# Patient Record
Sex: MALE | Race: WHITE | NOT HISPANIC OR LATINO | Employment: OTHER | URBAN - NONMETROPOLITAN AREA
[De-identification: names, ages, dates, MRNs, and addresses within clinical notes are randomized per-mention and may not be internally consistent; named-entity substitution may affect disease eponyms.]

---

## 2017-02-03 ENCOUNTER — TELEPHONE (OUTPATIENT)
Dept: ORTHOPEDICS | Facility: OTHER | Age: 72
End: 2017-02-03

## 2017-02-03 DIAGNOSIS — G89.29 CHRONIC SHOULDER PAIN, UNSPECIFIED LATERALITY: Primary | ICD-10-CM

## 2017-02-03 DIAGNOSIS — M25.519 CHRONIC SHOULDER PAIN, UNSPECIFIED LATERALITY: Primary | ICD-10-CM

## 2017-02-03 RX ORDER — CYCLOBENZAPRINE HCL 5 MG
TABLET ORAL
Qty: 30 TABLET | Refills: 0 | Status: SHIPPED | OUTPATIENT
Start: 2017-02-03 | End: 2017-03-22

## 2017-02-03 NOTE — TELEPHONE ENCOUNTER
Reason for call:  Medication    1. Medication Name? cyclobenzaprine (FLEXERIL) 5 MG tablet  2. Is this request for a refill? Yes  3. What Pharmacy do you use? [facility] [address] [place] [address]  4. Have you contacted your pharmacy? No    5. If yes, when?  (Please note that the turn-around-time for prescriptions is 72 business hours; I am sending your request at this time. SEND TO  [facility]  )  Description: Pt is having shoulder pain and would like to know if he can get a refill of this Rx from Dr Morris or Nik Gunn. He is in [place] for another month.  Was an appointment offered for this a call? No   Preferred method for responding to this messageTelephone Call -[phone]  If we cannot reach you directly, may we leave a detailed response at the number you provided? Yes  Can this message wait until your PCP/Provider returns if not available today? No

## 2017-02-03 NOTE — TELEPHONE ENCOUNTER
Last visit: 12.7.17 with Dipesh Morris.  Patient is having shoulder pain and requesting refill.  Both Arturo and Luis A are out.  Please advise.  Thank you  Last refill: 6.9.16 #[phone]

## 2017-03-08 ENCOUNTER — TRANSFERRED RECORDS (OUTPATIENT)
Dept: HEALTH INFORMATION MANAGEMENT | Facility: HOSPITAL | Age: 72
End: 2017-03-08

## 2017-03-08 DIAGNOSIS — M54.10 RADICULAR PAIN OF RIGHT LOWER EXTREMITY: ICD-10-CM

## 2017-03-08 DIAGNOSIS — M54.50 LUMBAGO: Primary | ICD-10-CM

## 2017-03-09 ENCOUNTER — HOSPITAL ENCOUNTER (OUTPATIENT)
Dept: MRI IMAGING | Facility: HOSPITAL | Age: 72
Discharge: HOME | End: 2017-03-09
Attending: ORTHOPAEDIC SURGERY | Admitting: ORTHOPAEDIC SURGERY
Payer: MEDICARE

## 2017-03-09 ENCOUNTER — RESULTS ONLY (OUTPATIENT)
Dept: CARE COORDINATION | Facility: OTHER | Age: 72
End: 2017-03-09

## 2017-03-09 PROCEDURE — 72148 MRI LUMBAR SPINE W/O DYE: CPT | Mod: TC

## 2017-03-14 ENCOUNTER — TRANSFERRED RECORDS (OUTPATIENT)
Dept: HEALTH INFORMATION MANAGEMENT | Facility: HOSPITAL | Age: 72
End: 2017-03-14

## 2017-03-14 DIAGNOSIS — M54.16 LUMBAR RADICULOPATHY: ICD-10-CM

## 2017-03-14 DIAGNOSIS — M48.061 SPINAL STENOSIS, LUMBAR REGION, WITHOUT NEUROGENIC CLAUDICATION: ICD-10-CM

## 2017-03-14 DIAGNOSIS — M51.26 DISPLACEMENT OF LUMBAR INTERVERTEBRAL DISC WITHOUT MYELOPATHY: Primary | ICD-10-CM

## 2017-03-15 ENCOUNTER — HOSPITAL ENCOUNTER (OUTPATIENT)
Dept: GENERAL RADIOLOGY | Facility: HOSPITAL | Age: 72
Discharge: HOME | End: 2017-03-15
Attending: ORTHOPAEDIC SURGERY | Admitting: ORTHOPAEDIC SURGERY
Payer: MEDICARE

## 2017-03-15 PROCEDURE — 99212 OFFICE O/P EST SF 10 MIN: CPT | Mod: TC

## 2017-03-15 NOTE — PROGRESS NOTES
"Pain Management Referral Consult    PATIENT HISTORICAL:    Patient Anatomy/region of concern:  Lumbar spine    When and how did your pain begin?   Date  1 year more pain 1/2017    Event  [place] spring 2016    Location of the pain? Low back and radiates to right knee, back of thigh    Describe what pain feels like:  Hurts when walks; sharp  Does it interfere with daily activities?  yes    What makes your pain better?  Sitting, not moving    Focal tenderness at one point along the length of a taut band (\"knotted muscle\")?  No    Restricted range of motion of the involved muscle or joint?  No    Did you have a previous injection series where you found relief of at least 3 months?  No  Have you had surgery in the area of pain?   No  If yes, when was the surgery?    What treatments have you already had for your pain?   Add length of time in weeks for all that apply.    Treatment Tried it--helped Tried it-- nohelp Made it worse   Pain clinic      Physical therapy      Chiropractic care      Spine injections       Other nerve blocks      Surgery      Exercise Sometimes helps  Sometimes makes it worse     Others (list):          Injection Documentation of Provider History    PER-PROVIDER HISTORY, Dr. Azar:  Is this for treatment of acute herpes zoster, post herpetic neuralgia, post-decompressive radiculitis, or post-surgical scarring?   No  Does the patient have radiculopathy or sciatica?  Yes  RIGHT  How long has the patient had any physical therapy, home therapy or chiropractor care?  0 weeks.  How long has the patient taken NSaids or muscle relaxants?  As needed for 2months.  Is there any history of systemic/local infection or unstable medical conditions?  No    "

## 2017-03-22 ENCOUNTER — OFFICE VISIT (OUTPATIENT)
Dept: ORTHOPEDICS | Facility: OTHER | Age: 72
End: 2017-03-22
Attending: ORTHOPAEDIC SURGERY
Payer: COMMERCIAL

## 2017-03-22 VITALS
SYSTOLIC BLOOD PRESSURE: 120 MMHG | BODY MASS INDEX: 18.34 KG/M2 | DIASTOLIC BLOOD PRESSURE: 76 MMHG | TEMPERATURE: 98 F | OXYGEN SATURATION: 98 % | RESPIRATION RATE: 16 BRPM | HEART RATE: 84 BPM | HEIGHT: 68 IN | WEIGHT: 121 LBS

## 2017-03-22 DIAGNOSIS — M19.112 OSTEOARTHRITIS OF LEFT SHOULDER DUE TO ROTATOR CUFF INJURY: Primary | ICD-10-CM

## 2017-03-22 DIAGNOSIS — M48.00 SPINAL STENOSIS: Primary | ICD-10-CM

## 2017-03-22 DIAGNOSIS — S46.002S OSTEOARTHRITIS OF LEFT SHOULDER DUE TO ROTATOR CUFF INJURY: Primary | ICD-10-CM

## 2017-03-22 DIAGNOSIS — M54.16 LUMBAR RADICULOPATHY: ICD-10-CM

## 2017-03-22 PROCEDURE — 99212 OFFICE O/P EST SF 10 MIN: CPT

## 2017-03-22 PROCEDURE — 99213 OFFICE O/P EST LOW 20 MIN: CPT | Performed by: ORTHOPAEDIC SURGERY

## 2017-03-22 ASSESSMENT — PAIN SCALES - GENERAL: PAINLEVEL: NO PAIN (0)

## 2017-03-22 NOTE — NURSING NOTE
"Chief Complaint   Patient presents with     RECHECK     3 Month follow up ORIF left clavicle fracture 06/06/16.       Initial /76 (BP Location: Right arm, Patient Position: Chair, Cuff Size: Adult Regular)  Pulse 84  Temp 98  F (36.7  C) (Tympanic)  Resp 16  Ht 5' 8\" (1.727 m)  Wt 121 lb (54.9 kg)  SpO2 98%  BMI 18.4 kg/m2 Estimated body mass index is 18.4 kg/(m^2) as calculated from the following:    Height as of this encounter: 5' 8\" (1.727 m).    Weight as of this encounter: 121 lb (54.9 kg).  Medication Reconciliation: complete   Donna Pérez LPN      "

## 2017-03-22 NOTE — PROGRESS NOTES
"Chief complaint:  #1.  DJD left shoulder  #2.  Probable recurrent rotator cuff tear left shoulder  #3.  Well healed left clavicle fracture, ORIF 6/6/16    Subjective: This 71-year-old right-handed retired male has a history of DJD of the left shoulder which developed after an open [place] procedure.  He failed to regain full range of motion or strength after that procedure.  In June of this year he sustained a left midshaft clavicle fracture which underwent ORIF on 6/16/16.  At an office visit on 9/14/16 the fracture was noted to be healing in anatomic alignment on x-ray.  I offered to refer him to physical therapy but he declined.  I instructed him on a home exercise program.  On 12/7/16 he returned reporting no discomfort at the fracture site, but discomfort at the left sternoclavicular joint and glenohumeral joint.  Again I suggested physical therapy, and again he declined stating he wanted to wait until he returned from going to [place] for the winter.    Today he reports he is back from [place].  His shoulder in general feels \"stiff\".  While in [place] he herniated a lumbar disc.  He saw an orthopedist in [place] who referred him for a steroid injection which has not occurred yet.  Because of his back he is not willing to go through physical therapy for his shoulder.  He said his back is his biggest problem at this time and wants to focus on it.    Other than the above, his neurologic and musculoskeletal review of systems are unchanged since seen last.    Examination: Thin but otherwise healthy-appearing male with appropriate mood and affect.  Active range of motion of the left shoulder is 150  of elevation, full external rotation and internal rotation and adduction.  Motor examination is grade 4+ in forward flexion and grade 4 in abduction.  The neurovascular status of the left hand is intact.    Impression: Same as chief complaints    Plan: I was able to talk him into attending one physical therapy session to " learn a home exercise program to do on his own.  I pointed out that he might as well convalesce from 2 problems at the same time so that he can hopefully feel better faster.  He agreed to the therapist instructed home exercise program and to return to see me as needed. Future options include obtaining an MRI to see if his cuff is torn again, in which case he may be a candidate for a RTSA.

## 2017-03-22 NOTE — MR AVS SNAPSHOT
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 *     [facility] provides Physical Therapy evaluation and treatment and many specialty services across the [place] [facility] system.  If requesting a specialty program, please choose from the list below.    If you have not heard from the scheduling office within 2 business days, please call [phone] for all locations, with the exception of [place], please call [phone].  Treatment: Evaluation & Treatment  Special Instructions/Modalities: 1 teaching session for left shoulder home exercise program: ROM and strengthening  Special Programs:     Please be aware that coverage of these services is subject to the terms and limitations of your health insurance plan.  Call member services at your health plan with any benefit or coverage questions.      **Note to Provider:  If you are referring outside of [facility] for the therapy appointment, please list the name of the location in the  special instructions  above, print the referral and give to the patient to schedule the appointment.                  Follow-up notes from your care team     Return if symptoms worsen or fail to improve.      Who to contact     If you have questions or need follow up information about today's clinic visit or your schedule please contact  ORTHOPEDICS directly  "at [phone].  Normal or non-critical lab and imaging results will be communicated to you by MyChart, letter or phone within 4 business days after the clinic has received the results. If you do not hear from us within 7 days, please contact the clinic through [employer] [facility] or phone. If you have a critical or abnormal lab result, we will notify you by phone as soon as possible.  Submit refill requests through [employer] or call your pharmacy and they will forward the refill request to us. Please allow 3 business days for your refill to be completed.          Additional Information About Your Visit        [employer] [employer] Information     [employer] lets you send messages to your doctor, view your test results, renew your prescriptions, schedule appointments and more. To sign up, go to [email or web address] . Click on \"Log in\" on the left side of the screen, which will take you to the Welcome page. Then click on \"Sign up Now\" on the right side of the page.     You will be asked to enter the access code listed below, as well as some personal information. Please follow the directions to create your username and password.     Your access code is: [email or web address]  Expires: 2017 11:02 AM     Your access code will  in 90 days. If you need help or a new code, please call your [place] clinic or [phone].        Care EveryWhere ID     This is your Care EveryWhere ID. This could be used by other organizations to access your [place] medical records  [id number]        Your Vitals Were     Pulse Temperature Respirations Height Pulse Oximetry BMI (Body Mass Index)    84 98  F (36.7  C) (Tympanic) 16 5' 8\" (1.727 m) 98% 18.4 kg/m2       Blood Pressure from Last 3 Encounters:   17 120/76   16 104/80   16 118/68    Weight from Last 3 Encounters:   17 121 lb (54.9 kg)   16 116 lb (52.6 kg)   16 115 lb (52.2 kg)              We Performed the Following     PHYSICAL THERAPY REFERRAL        Primary Care " Provider Office Phone # Fax #    R Ryley Saleh [phone]       [facility] [place] [address] [place] [address]        Thank you!     Thank you for choosing  ORTHOPEDICS  for your care. Our goal is always to provide you with excellent care. Hearing back from our patients is one way we can continue to improve our services. Please take a few minutes to complete the written survey that you may receive in the mail after your visit with us. Thank you!             Your Updated Medication List - Protect others around you: Learn how to safely use, store and throw away your medicines at www.disposemymeds.org.      Notice  As of 3/22/2017 11:02 AM    You have not been prescribed any medications.

## 2017-03-24 ENCOUNTER — HOSPITAL ENCOUNTER (OUTPATIENT)
Dept: INTERVENTIONAL RADIOLOGY/VASCULAR | Facility: HOSPITAL | Age: 72
Discharge: HOME | End: 2017-03-24
Attending: ORTHOPAEDIC SURGERY | Admitting: ORTHOPAEDIC SURGERY
Payer: MEDICARE

## 2017-03-24 PROCEDURE — 64483 NJX AA&/STRD TFRM EPI L/S 1: CPT | Mod: TC

## 2017-03-24 PROCEDURE — 25000125 ZZHC RX 250: Performed by: RADIOLOGY

## 2017-03-24 RX ORDER — IOPAMIDOL 612 MG/ML
15 INJECTION, SOLUTION INTRATHECAL ONCE
Status: COMPLETED | OUTPATIENT
Start: 2017-03-24 | End: 2017-03-24

## 2017-03-24 RX ORDER — DEXAMETHASONE SODIUM PHOSPHATE 10 MG/ML
INJECTION, SOLUTION INTRAMUSCULAR; INTRAVENOUS
Status: DISPENSED
Start: 2017-03-24 | End: 2017-03-24

## 2017-03-24 RX ORDER — DEXAMETHASONE SODIUM PHOSPHATE 10 MG/ML
10 INJECTION, SOLUTION INTRAMUSCULAR; INTRAVENOUS ONCE
Status: COMPLETED | OUTPATIENT
Start: 2017-03-24 | End: 2017-03-24

## 2017-03-24 RX ADMIN — IOPAMIDOL 3 ML: 612 INJECTION, SOLUTION INTRATHECAL at 11:09

## 2017-03-24 RX ADMIN — DEXAMETHASONE SODIUM PHOSPHATE 10 MG: 10 INJECTION INTRAMUSCULAR; INTRAVENOUS at 11:10

## 2017-03-24 NOTE — IP AVS SNAPSHOT
MRN:[id number]                      After Visit Summary   3/24/2017    Augie Hernandez    MRN: [id number]           Visit Information        Provider Department      3/24/2017 10:30 AM Radiologist, Need Interventional; [facility]           Review of your medicines      Notice     You have not been prescribed any medications.             Protect others around you: Learn how to safely use, store and throw away your medicines at www.disposemymeds.org.         Follow-ups after your visit        Your next 10 appointments already scheduled     Mar 28, 2017  9:00 AM CDT   Evaluation with Martha Espinoza, PT   [facility] ([facility] )    [address] [place] [address]   [phone]               Care Instructions        Further instructions from your care team       Home number on file [phone] (home)  Is it ok to leave a message if you are not home yes    Dr Sánchez completed your brenda lumbar procedure on 3/24/2017.    Current Pain Level (0-10 Scale): 5/10  Post Pain Level (0-10):  0/10    Patient will be contacted by a diagnostic imaging nurse via telephone for follow up on pain levels in 2 weeks.    Radiology Discharge instructions for Steroid Injection    Activity Level:     Do not do any heavy activity or exercise for 24 hours.   Do not drive for 4 hours after your injection.  Diet:   Return to your normal diet.  Medications:   If you have stopped taking your Aspirin, Coumadin/Warfarin, Ibuprofen, or any   other blood thinner for this procedure you may resume in the morning unless   your primary care provider has given you other instructions.    Diabetics may see an increase in blood sugar after steroid injections. If you are concerned about your blood sugar, please contact your family doctor.    Site Care:  Remove the bandage and bathe or shower the morning after the procedure.      Call your Primary Care Provider if you  "have the following (if your primary care provider is not available please seek emergency care):   Nausea with vomiting   Severe headache   Drowsiness or confusion   Redness or drainage at the injection or puncture site   Temperature over 101 degrees F   Other concerns   Worsening back pain   Stiff neck       Additional Information About Your Visit        MyChart Information     [employer] lets you send messages to your doctor, view your test results, renew your prescriptions, schedule appointments and more. To sign up, go to [email or web address] . Click on \"Log in\" on the left side of the screen, which will take you to the Welcome page. Then click on \"Sign up Now\" on the right side of the page.     You will be asked to enter the access code listed below, as well as some personal information. Please follow the directions to create your username and password.     Your access code is: [email or web address]  Expires: 2017 11:02 AM     Your access code will  in 90 days. If you need help or a new code, please call your [place] clinic or [phone].        Care EveryWhere ID     This is your Care EveryWhere ID. This could be used by other organizations to access your [place] medical records  [id number]         Primary Care Provider Office Phone # Fax #    R Ryley Saleh [phone]      Thank you!     Thank you for choosing [place] for your care. Our goal is always to provide you with excellent care. Hearing back from our patients is one way we can continue to improve our services. Please take a few minutes to complete the written survey that you may receive in the mail after you visit with us. Thank you!             Medication List: This is a list of all your medications and when to take them. Check marks below indicate your daily home schedule. Keep this list as a reference.      Notice     You have not been prescribed any medications.      "

## 2017-03-24 NOTE — PROGRESS NOTES
Fluoro time for this case was 22 seconds, less than 5 min  Was patient held? NO  If yes, by whom? NA

## 2017-03-24 NOTE — DISCHARGE INSTRUCTIONS
Home number on file [phone] (home)  Is it ok to leave a message if you are not home yes    Dr Sánchez completed your brenda lumbar procedure on 3/24/2017.    Current Pain Level (0-10 Scale): 5/10  Post Pain Level (0-10):  0/10    Patient will be contacted by a diagnostic imaging nurse via telephone for follow up on pain levels in 2 weeks.    Radiology Discharge instructions for Steroid Injection    Activity Level:     Do not do any heavy activity or exercise for 24 hours.   Do not drive for 4 hours after your injection.  Diet:   Return to your normal diet.  Medications:   If you have stopped taking your Aspirin, Coumadin/Warfarin, Ibuprofen, or any   other blood thinner for this procedure you may resume in the morning unless   your primary care provider has given you other instructions.    Diabetics may see an increase in blood sugar after steroid injections. If you are concerned about your blood sugar, please contact your family doctor.    Site Care:  Remove the bandage and bathe or shower the morning after the procedure.      Call your Primary Care Provider if you have the following (if your primary care provider is not available please seek emergency care):   Nausea with vomiting   Severe headache   Drowsiness or confusion   Redness or drainage at the injection or puncture site   Temperature over 101 degrees F   Other concerns   Worsening back pain   Stiff neck

## 2017-03-24 NOTE — IP AVS SNAPSHOT
[facility]    [address] [place] [address]    Phone:  [phone]    Fax:  [phone]                                       After Visit Summary   3/24/2017    Augie Hernandez    MRN: [id number]           After Visit Summary Signature Page     I have received my discharge instructions, and my questions have been answered. I have discussed any challenges I see with this plan with the nurse or doctor.    ..........................................................................................................................................  Patient/Patient Representative Signature      ..........................................................................................................................................  Patient Representative Print Name and Relationship to Patient    ..................................................               ................................................  Date                                            Time    ..........................................................................................................................................  Reviewed by Signature/Title    ...................................................              ..............................................  Date                                                            Time

## 2017-03-28 ENCOUNTER — HOSPITAL ENCOUNTER (OUTPATIENT)
Dept: PHYSICAL THERAPY | Facility: HOSPITAL | Age: 72
Setting detail: THERAPIES SERIES
End: 2017-03-28
Attending: ORTHOPAEDIC SURGERY
Payer: MEDICARE

## 2017-03-28 PROCEDURE — 97162 PT EVAL MOD COMPLEX 30 MIN: CPT | Mod: GP

## 2017-03-28 PROCEDURE — G8985 CARRY GOAL STATUS: HCPCS | Mod: GP,CI

## 2017-03-28 PROCEDURE — 40000718 ZZHC STATISTIC PT DEPARTMENT ORTHO VISIT

## 2017-03-28 PROCEDURE — G8984 CARRY CURRENT STATUS: HCPCS | Mod: GP,CL

## 2017-03-28 PROCEDURE — 97110 THERAPEUTIC EXERCISES: CPT | Mod: GP

## 2017-03-28 NOTE — PROGRESS NOTES
03/28/17 0900   General Information   Type of Visit Initial OP Ortho PT Evaluation   Start of Care Date 03/28/17   Referring Physician Dr. Morris   Patient/Family Goals Statement One session to learn exercises, more movement   Orders Evaluate and Treat   Insurance Type Medicare   Medical Diagnosis DJD L shoulder, s/p ORIF left clavicle fracture on 6/6/16, [facility] [place] RTC repair   Surgical/Medical history reviewed Yes   Presentation and Etiology   Pertinent history of current problem (include personal factors and/or comorbidities that impact the POC) Reports issues with range of motion and strength. Reports he has had rotator cuff repairs in the past as well as a broken collar bone last spring. Reports pain with laying on shoulder and feelings of stiffness. Reports trouble lifting anything heavier than a milk jug. Reports he doesn't really want to do PT because he has done so much in the past.   Impairments A. Pain;D. Decreased ROM;E. Decreased flexibility   Functional Limitations perform required work activities;perform activities of daily living   Symptom Location L shoulder   Onset date of current episode/exacerbation 05/17/16   Chronicity Recurrent   Best (/10) 3   Worst (/10) 3   Pain quality C. Aching   Frequency of pain/symptoms C. With activity   Pain/symptoms are: Worse during the day   Pain/symptoms exacerbated by C. Lifting;D. Carrying;H. Overhead reach   Pain/symptoms eased by C. Rest   Progression of symptoms since onset: Unchanged   Shoulder Objective Findings   Side (if bilateral, select both right and left) Left   Observation no acute distress   Posture forward head, rounded shoulders   Left Shoulder Flexion AROM 75%, shoulder hiking compensation pattern   Left Shoulder Abduction AROM Full   Left Shoulder Flexion Strength 4/5   Left Shoulder Abduction Strength 4/5   Left Shoulder ER Strength 5/5   Left Shoulder IR Strength 5/5   Palpation Decreased scapular stability palpated on L c shoulder  flexion/abduction. Compensates for lack of flexion ROM by hiking shoulder, over firing upper trapezius   Left Shoulder ER AROM Full   Left Shoulder IR AROM Full   Left Mid Trapezius Strength 4-/5   Left Lower Trapezius Strength 4-/5   Planned Therapy Interventions   Planned Therapy Interventions strengthening;stretching;ROM   Clinical Impression   Criteria for Skilled Therapeutic Interventions Met yes, treatment indicated   PT Diagnosis Impaired ROM, weakness, pain   Influenced by the following impairments Impaired L shoulder ROM, L shoulder pain/clicking, weakness, muscular imbalance, postural impairment   Functional limitations due to impairments Decreased ability to carry out ADLs especially activities over shoulder height due to weakness and impaired ROM   Clinical Presentation Evolving/Changing   Clinical Presentation Rationale Significant PMH of shoulder issues/injuries   Clinical Decision Making (Complexity) Moderate complexity   Therapy Frequency 2 times/day  (Orders for 1 time only)   Predicted Duration of Therapy Intervention (days/wks) up to 90 days   Risk & Benefits of therapy have been explained Yes   Patient, Family & other staff in agreement with plan of care Yes   Clinical Impression Comments Pt has orders for HEP only. In reading progress note from surgeon it appears as though this is due to pt not being willing to do PT. Pt more agreeable to participating today.Pt is going to try for 3 weeks c HEP and return if need for continued PT   Ortho Goal 1   Goal Identifier STG 1   Goal Description Pt will demonstrate knowledge/understanding of HEP and report daily compliance   Target Date 04/11/17   Ortho Goal 2   Goal Identifier LTG 1   Goal Description Pt will use LUE during daily activities including overhead without being limited by pain or weakness   Target Date 06/26/17   Ortho Goal 3   Goal Identifier LTG 2   Goal Description Pt will demonstrate full AROM bilateral UE for improve function and  decreased pain   Target Date 06/26/17   Total Evaluation Time   Total Evaluation Time 15   Therapy Certification   Certification date from 03/28/17   Certification date to 06/26/17   Medical Diagnosis L shoulder pain     I certify the need for these services furnished under this plan of treatment and while under my care. (Physician co-signature of this document indicates review and certification of the therapy plan).

## 2017-04-07 ENCOUNTER — TELEPHONE (OUTPATIENT)
Dept: INTERVENTIONAL RADIOLOGY/VASCULAR | Facility: HOSPITAL | Age: 72
End: 2017-04-07

## 2019-03-19 ENCOUNTER — TRANSFERRED RECORDS (OUTPATIENT)
Dept: HEALTH INFORMATION MANAGEMENT | Facility: CLINIC | Age: 74
End: 2019-03-19

## 2019-05-22 ENCOUNTER — TRANSFERRED RECORDS (OUTPATIENT)
Dept: HEALTH INFORMATION MANAGEMENT | Facility: CLINIC | Age: 74
End: 2019-05-22

## 2019-10-04 NOTE — PROGRESS NOTES
Subjective     Augie Hernandez is a 74 year old male who presents to clinic today for the following health issues:    HPI   Sore throat       Duration: 3 weeks    Description (location/character/radiation): sore throat, pain in the back of head, when he is chewing he finds he can't hardly open his mouth due to pain in the back of the head, and also tooth pain. States he is unable to lay on the back of his head with neck pain    Intensity:  moderate, severe    Accompanying signs and symptoms: unable to eat d/t pain in jaw/neck. Weight loss. He has a history of chronic neck pain    History (similar episodes/previous evaluation): None    Precipitating or alleviating factors: None    Therapies tried and outcome: Aleve with some relief    He doesn't grind his teeth. Jaw is not more sore in am       Patient Active Problem List   Diagnosis     Vertigo     Fracture of ribs, seven, closed     ACP (advance care planning)     Past Surgical History:   Procedure Laterality Date     OPEN REDUCTION INTERNAL FIXATION CLAVICLE Left 2016    Procedure: OPEN REDUCTION INTERNAL FIXATION CLAVICLE;  Surgeon: Dipesh Morris MD;  Location: [facility]     ORTHOPEDIC SURGERY      left rotator cuff repair       Social History     Tobacco Use     Smoking status: Current Every Day Smoker     Packs/day: 0.50     Types: Cigarettes     Smokeless tobacco: Never Used   Substance Use Topics     Alcohol use: Yes     Alcohol/week: 0.0 standard drinks     Comment: 17- a few beers 4-5 times a week.     Family History   Problem Relation Age of Onset     Cancer Father          from some form of sinus cancer     C.A.D. Brother          with Coronary Artery Disease at age 51         Current Outpatient Medications   Medication Sig Dispense Refill     naproxen (NAPROSYN) 500 MG tablet Take 1 tablet (500 mg) by mouth 2 times daily (with meals) 20 tablet 0     tiZANidine (ZANAFLEX) 4 MG tablet Take 1 tablet (4 mg) by mouth 3 times daily 20  "tablet 0     Allergies   Allergen Reactions     Duloxetine Hydrochloride Nausea     Cymbalta     Azithromycin Hives and Rash     Zithromax       Reviewed and updated as needed this visit by Provider  Allergies  Meds  Problems  Med Hx  Surg Hx         Review of Systems   ROS COMP: Constitutional, HEENT, cardiovascular, pulmonary, gi and gu systems are negative, except as otherwise noted. -fever. -chills. +jaw pain without radiation. -chest pain. +decreased appetite.      Objective    /64 (Patient Position: Sitting)   Pulse 93   Ht 1.715 m (5' 7.5\")   Wt 52.1 kg (114 lb 12.8 oz)   SpO2 96%   BMI 17.71 kg/m    Body mass index is 17.71 kg/m .  Physical Exam   GENERAL: healthy, alert and no distress  HENT: ear canals and TM's normal, nose and mouth without ulcers or lesions. NO Trismus. TTP to right TMJ region.   NECK: no adenopathy, no asymmetry, masses, or scars and thyroid normal to palpation  RESP: lungs clear to auscultation - no rales, rhonchi or wheezes  CV: regular rate and rhythm, normal S1 S2, no S3 or S4, no murmur, click or rub, no peripheral edema and peripheral pulses strong  ABDOMEN: soft, nontender, no hepatosplenomegaly, no masses and bowel sounds normal  MS: no gross musculoskeletal defects noted, no edema. No step offs to cervical neck. TTP to C1-C2 region. No redness, rash, warmth, or drainage to posterior neck. Full ROM intact to neck.   NEURO: Normal strength and tone, mentation intact and speech normal  PSYCH: mentation appears normal, affect normal/bright  LYMPH: no cervical, supraclavicular, axillary, or inguinal adenopathy    Diagnostic Test Results:  Labs reviewed in Epic      Assessment & Plan   Assessment      Plan  (M54.2) Neck pain  (primary encounter diagnosis)  Comment: Increasing pain. He follows with OA and will have his images from CT pushed to Dr. Azar.  Plan: CT Cervical Spine w/o Contrast, tiZANidine         (ZANAFLEX) 4 MG tablet            (M26.609) TMJ " (temporomandibular joint syndrome)  Comment: Exam and symptoms consistent with TMJ. Soft foods.  Plan: tiZANidine (ZANAFLEX) 4 MG tablet, naproxen         (NAPROSYN) 500 MG tablet          Follow up with dentist if not improving      Tobacco Cessation:   reports that he has been smoking cigarettes. He has been smoking about 0.50 packs per day. He has never used smokeless tobacco.  Tobacco Cessation Action Plan: Information offered: Patient not interested at this time      See Patient Instructions    Return if symptoms worsen or fail to improve.    Michell Tirado NP  [facility] - [place]

## 2019-10-08 ENCOUNTER — HOSPITAL ENCOUNTER (OUTPATIENT)
Dept: CT IMAGING | Facility: HOSPITAL | Age: 74
Discharge: HOME | End: 2019-10-08
Attending: NURSE PRACTITIONER | Admitting: NURSE PRACTITIONER
Payer: MEDICARE

## 2019-10-08 ENCOUNTER — OFFICE VISIT (OUTPATIENT)
Dept: FAMILY MEDICINE | Facility: OTHER | Age: 74
End: 2019-10-08
Attending: NURSE PRACTITIONER
Payer: MEDICARE

## 2019-10-08 VITALS
SYSTOLIC BLOOD PRESSURE: 132 MMHG | HEIGHT: 68 IN | BODY MASS INDEX: 17.4 KG/M2 | WEIGHT: 114.8 LBS | HEART RATE: 93 BPM | DIASTOLIC BLOOD PRESSURE: 64 MMHG | OXYGEN SATURATION: 96 %

## 2019-10-08 DIAGNOSIS — M54.2 NECK PAIN: Primary | ICD-10-CM

## 2019-10-08 DIAGNOSIS — M26.609 TMJ (TEMPOROMANDIBULAR JOINT SYNDROME): ICD-10-CM

## 2019-10-08 DIAGNOSIS — M54.2 NECK PAIN: ICD-10-CM

## 2019-10-08 PROCEDURE — G0463 HOSPITAL OUTPT CLINIC VISIT: HCPCS

## 2019-10-08 PROCEDURE — G0463 HOSPITAL OUTPT CLINIC VISIT: HCPCS | Mod: 25

## 2019-10-08 PROCEDURE — 72125 CT NECK SPINE W/O DYE: CPT | Mod: TC

## 2019-10-08 PROCEDURE — 99214 OFFICE O/P EST MOD 30 MIN: CPT | Performed by: NURSE PRACTITIONER

## 2019-10-08 RX ORDER — NAPROXEN 500 MG/1
500 TABLET ORAL 2 TIMES DAILY WITH MEALS
Qty: 20 TABLET | Refills: 0 | Status: SHIPPED | OUTPATIENT
Start: 2019-10-08 | End: 2019-10-22

## 2019-10-08 ASSESSMENT — PAIN SCALES - GENERAL: PAINLEVEL: MODERATE PAIN (4)

## 2019-10-08 ASSESSMENT — MIFFLIN-ST. JEOR: SCORE: 1232.29

## 2019-10-08 NOTE — NURSING NOTE
"Chief Complaint   Patient presents with     Pharyngitis     Neck Pain       Initial /64 (Patient Position: Sitting)   Pulse 93   Ht 1.715 m (5' 7.5\")   Wt 52.1 kg (114 lb 12.8 oz)   SpO2 96%   BMI 17.71 kg/m   Estimated body mass index is 17.71 kg/m  as calculated from the following:    Height as of this encounter: 1.715 m (5' 7.5\").    Weight as of this encounter: 52.1 kg (114 lb 12.8 oz).  Medication Reconciliation: complete  Elise Wolff LPN  "

## 2019-10-08 NOTE — PATIENT INSTRUCTIONS
Patient Education     TMJ Syndrome  The temporomandibular joint (TMJ) is the joint that connects your lower jaw to your head. You can feel it in front of your ears when you open and close your mouth. TMJ disorders involve chronic or recurrent pain in the joint. When treated, symptoms of TMJ disorders usually go away within a few months.  Causes  There is no widely agreed-on cause of TMJ disorders. They have been linked to injury, arthritis, chronic fatigue syndrome, and fibromyalgia. A definite connection has not been shown, though.  Symptoms    Pain in the face, jaw, or neck    Pain with jaw movement or chewing    Locking or catching sensation of the jaw    Clicking, popping, or grinding sounds with movement of the TMJ    Headache    Ear pain  Home care  Modest, nonsurgical treatments are a good first step toward relieving symptoms. Try the approaches described below.    Rest the jaw by avoiding crunchy or hard-to-chew foods. Don t eat hard or sticky candies. Soft foods and liquids are easier on the jaw.    Protect your jaw while yawning. If you need to yawn, put your fist under your chin to prevent your mouth from opening up too wide.    To help relieve pain, try applying hot or cold packs to the painful area. Try both hot and cold to find out which works best for you. To make a cold pack, put ice cubes in a plastic bag that seals at the top. Wrap the bag in a clean, thin towel or cloth. Never put ice or an ice pack directly on the skin. If you use hot packs (small towels soaked in hot water), be careful not to burn yourself.    You may take acetaminophen or ibuprofen for pain, unless you were given a different pain medicine. (Note: If you have chronic liver or kidney disease or have ever had a stomach ulcer or gastrointestinal bleeding, talk with your healthcare provider before using these medicines. Also talk to your provider if you are taking medicine to prevent blood clots.) Don t give aspirin to a child  younger than age 19 unless directed by the child s provider. Taking aspirin can put a child at risk for Reye syndrome. This is a rare but very serious disorder that most often affects the brain and the liver.  Reducing stress  If stress seems to be contributing to your symptoms, try to identify the sources of stress in your life. These aren t always obvious. Common stressors include:    Everyday hassles. These include things such as traffic jams, missed appointments, or car trouble.    Major life changes. These can be good, such as a new baby or job promotion. And they can be bad, such as losing a job or losing a loved one.    Overload. The feeling that you have too many responsibilities and can't take care of everything at once.    Helplessness. Feeling like your problems are more than you can solve.  When possible, do something about your sources of stress. See if you can avoid hassles, limit the amount of change in your life at one time, and take breaks when you feel overloaded.  Unfortunately, many stressful situations cannot be avoided. So learning how to manage stress better is very important. Getting regular exercise, eating nutritious, balanced meals, and getting adequate rest all help to make everyday stress more manageable. Certain techniques are also helpful: relaxation and breathing exercises, visualization, biofeedback, meditation, or simply taking some time out to clear your mind. For more information, talk with your healthcare provider.  Follow-up care  Follow up with your healthcare provider, or as advised. Further testing and additional treatment may be required. If changes to your lifestyle do not improve your symptoms, talk with your healthcare provider about other available therapies. These include bite guards for help with teeth grinding, stress management techniques, and more. If stress is an important factor and does not respond to the above simple measures, talk with your healthcare provider  about a referral for stress management.  If X-rays were done, they will be reviewed by a specialist. You will be notified of the results, especially if they affect treatment.  Call 911  Call 911 if any of these occur:    Trouble breathing or swallowing, wheezing    Confusion    Extreme drowsiness or trouble awakening    Fainting or loss of consciousness    Rapid heart rate  When to seek medical advice  Call your healthcare provider right away if any of these occur:    Swollen or red face    Pain gets worse    Neck, mouth, tooth, or throat pain gets worse    Fever of 100.4 F (38 C) or higher, or as directed by your healthcare provider  Date Last Reviewed: 10/1/2017    4326-1096 [employer]. [address] [place] [address], [place] [address]. All rights reserved. This information is not intended as a substitute for professional medical care. Always follow your healthcare professional's instructions.    Patient Education     Neck Pain    There are several possible causes of neck pain when there is no injury:    You can get a minor ligament sprain or muscle strain from a sudden minor neck movement. Sleeping with your neck in an awkward position can also cause this.    Some people respond to emotional stress by tensing the muscles of their neck, shoulders, and upper back. Chronic spasm in these muscles can cause neck pain and sometimes headaches.    Gradual wear and tear of the joints in the spine can cause degenerative arthritis. This can be a source of occasional or chronic neck pain.    The spinal disks may bulge and put pressure on a nearby spinal nerve. This can happen as a natural result of aging or repeated small injuries to the neck. The spinal disks are the cushions between each spinal bone. This causes tingling, pain, or numbness that spreads from the neck to the shoulder, arm, or hand on one side.  Acute neck pain usually gets better in 1 to 2 weeks. Neck pain related to disk disease, arthritis in the  spinal joints, or spinal stenosis can become chronic and last for months or years. Spinal stenosis is narrowing of the spinal canal.  X-rays are usually not ordered for the initial evaluation of neck pain. However, X-rays may be done if you had a forceful physical injury, such as a car accident or fall. If pain continues and doesn t respond to medical treatment, X-rays and other tests may be done at a later time.  Home care    Rest and relax the muscles. Use a comfortable pillow that supports the head. It should also help keep the spine in a neutral position. The position of the head should not be tilted forward or backward. A rolled up towel may help for a custom fit.    Some people find relief with heat. Heat can be applied with either a warm shower or bath or a moist towel heated in the microwave and massage. Others prefer cold packs. You can make an ice pack by filling a plastic bag that seals at the top with ice cubes or crushed ice and then wrapping it with a thin towel. Try both and use the method that feels best for 15 to 20 minutes, several times a day.    Whether using ice or heat, be careful that you do not injure your skin. Never put ice directly on the skin. Always wrap the ice in a towel or other type of cloth.This is very important, especially in people with poor skin sensations.     Try to reduce your stress level. Emotional stress can lead to neck muscle tension and get in the way of or delay the healing process.    You may use over-the-counter pain medicine to control pain, unless another medicine was prescribed. If you have chronic liver or kidney disease or ever had a stomach ulcer or GI bleeding, talk with your healthcare provider before using these medicines.  Follow-up care  Follow up with your healthcare provider if your symptoms do not show signs of improvement after one week. Physical therapy or further tests may be needed.  If X-rays, CT scans, or MRI scans were taken, you will be told of  any new findings that may affect your care.  Call 911  Call 911 if you have:    Sudden weakness or numbness in one or both arms    Neck swelling, difficulty or painful swallowing    Difficulty breathing    Chest pain  When to seek medical advice  Call your healthcare provider right away if any of these occur:    Pain becomes worse or spreads into one or both arm    Increasing headache    Fever of 100.4 F (38 C) or higher, or as directed by your healthcare provider  Date Last Reviewed: 7/1/2016 2000-2018 [employer]. [address] [place] [address]. All rights reserved. This information is not intended as a substitute for professional medical care. Always follow your healthcare professional's instructions.

## 2019-10-18 NOTE — PROGRESS NOTES
Subjective     Augie Hernandez is a 74 year old male who presents to clinic today for the following health issues:    HPI   Musculoskeletal problem/pain      Duration: 5 weeks    Description  Location: pain in the back of head, pain increases with chewing    Intensity:  moderate    Accompanying signs and symptoms: pain stopped 2 days ago and he was able to eat    History  Previous similar problem: Yes, seen 2019  Previous evaluation:  Yes, seen 2019    Precipitating or alleviating factors:  Trauma or overuse: no   Aggravating factors include: Chewing    Therapies tried and outcome: Zanaflex and Naprosyn. Seemed to decrease symptoms    Dentist appointment scheduled tomorrow with Dr. Lehman      Patient Active Problem List   Diagnosis     Vertigo     Fracture of ribs, seven, closed     ACP (advance care planning)     Past Surgical History:   Procedure Laterality Date     OPEN REDUCTION INTERNAL FIXATION CLAVICLE Left 2016    Procedure: OPEN REDUCTION INTERNAL FIXATION CLAVICLE;  Surgeon: Dipesh Morris MD;  Location: [facility]     ORTHOPEDIC SURGERY      left rotator cuff repair       Social History     Tobacco Use     Smoking status: Current Every Day Smoker     Packs/day: 0.50     Types: Cigarettes     Smokeless tobacco: Never Used   Substance Use Topics     Alcohol use: Yes     Alcohol/week: 0.0 standard drinks     Comment: 17- a few beers 4-5 times a week.     Family History   Problem Relation Age of Onset     Cancer Father          from some form of sinus cancer     C.A.D. Brother          with Coronary Artery Disease at age 51         No current outpatient medications on file.     Allergies   Allergen Reactions     Duloxetine Hydrochloride Nausea     Cymbalta     Azithromycin Hives and Rash     Zithromax       Reviewed and updated as needed this visit by Provider         Review of Systems   ROS COMP: Constitutional, HEENT, cardiovascular, pulmonary, gi and gu systems are  "negative, except as otherwise noted. +back of head pain. +pain with chewing.       Objective    /56 (BP Location: Right arm, Patient Position: Sitting, Cuff Size: Adult Large)   Pulse 69   Temp 97.2  F (36.2  C) (Tympanic)   Ht 1.715 m (5' 7.5\")   Wt 51.9 kg (114 lb 6.4 oz)   SpO2 98%   BMI 17.65 kg/m    Body mass index is 17.65 kg/m .  Physical Exam   GENERAL: healthy, alert and no distress  HENT: ear canals and TM's normal, nose and mouth without ulcers or lesions. NO Trismus. Mild discomfort to posterior mid occiput region. NO TTP to TMJ region.   NECK: no adenopathy, no asymmetry, masses, or scars and thyroid normal to palpation  RESP: lungs clear to auscultation - no rales, rhonchi or wheezes  CV: regular rate and rhythm, normal S1 S2, no S3 or S4, no murmur, click or rub, no peripheral edema and peripheral pulses strong  MS: no gross musculoskeletal defects noted, no edema  NEURO: Normal strength and tone, mentation intact and speech normal  PSYCH: mentation appears normal, affect normal/bright  LYMPH: no cervical, supraclavicular, axillary, or inguinal adenopathy    Diagnostic Test Results:  Labs reviewed in Epic  none         Assessment & Plan   Assessment      Plan  (M26.609) TMJ (temporomandibular joint syndrome)  (primary encounter diagnosis)  Comment: Improved. Encouraged to discuss a bite guard with his dentist tomorrow. Aleve PRN.   Plan: Improved. Dentist tomorrow. Follow up PRN    Tobacco Cessation:   reports that he has been smoking cigarettes. He has been smoking about 0.50 packs per day. He has never used smokeless tobacco.  Tobacco Cessation Action Plan: Information offered: Patient not interested at this time        See Patient Instructions    Return if symptoms worsen or fail to improve.    Michell Tirado NP  [facility] - [place]        "

## 2019-10-22 ENCOUNTER — OFFICE VISIT (OUTPATIENT)
Dept: FAMILY MEDICINE | Facility: OTHER | Age: 74
End: 2019-10-22
Attending: NURSE PRACTITIONER
Payer: COMMERCIAL

## 2019-10-22 VITALS
DIASTOLIC BLOOD PRESSURE: 56 MMHG | BODY MASS INDEX: 17.34 KG/M2 | OXYGEN SATURATION: 98 % | TEMPERATURE: 97.2 F | SYSTOLIC BLOOD PRESSURE: 126 MMHG | WEIGHT: 114.4 LBS | HEART RATE: 69 BPM | HEIGHT: 68 IN

## 2019-10-22 DIAGNOSIS — M26.609 TMJ (TEMPOROMANDIBULAR JOINT SYNDROME): Primary | ICD-10-CM

## 2019-10-22 PROCEDURE — G0463 HOSPITAL OUTPT CLINIC VISIT: HCPCS

## 2019-10-22 PROCEDURE — 99213 OFFICE O/P EST LOW 20 MIN: CPT | Performed by: NURSE PRACTITIONER

## 2019-10-22 ASSESSMENT — MIFFLIN-ST. JEOR: SCORE: 1230.47

## 2019-10-22 ASSESSMENT — PAIN SCALES - GENERAL: PAINLEVEL: SEVERE PAIN (6)

## 2019-10-22 NOTE — NURSING NOTE
"Chief Complaint   Patient presents with     RECHECK     neck/jaw       Initial /56 (BP Location: Right arm, Patient Position: Sitting, Cuff Size: Adult Large)   Pulse 69   Temp 97.2  F (36.2  C) (Tympanic)   Ht 1.715 m (5' 7.5\")   Wt 51.9 kg (114 lb 6.4 oz)   SpO2 98%   BMI 17.65 kg/m   Estimated body mass index is 17.65 kg/m  as calculated from the following:    Height as of this encounter: 1.715 m (5' 7.5\").    Weight as of this encounter: 51.9 kg (114 lb 6.4 oz).  Medication Reconciliation: complete  Lacy Jones LPN  "

## 2019-10-22 NOTE — PATIENT INSTRUCTIONS
Patient Education     TMJ Syndrome  The temporomandibular joint (TMJ) is the joint that connects your lower jaw to your head. You can feel it in front of your ears when you open and close your mouth. TMJ disorders involve chronic or recurrent pain in the joint. When treated, symptoms of TMJ disorders usually go away within a few months.  Causes  There is no widely agreed-on cause of TMJ disorders. They have been linked to injury, arthritis, chronic fatigue syndrome, and fibromyalgia. A definite connection has not been shown, though.  Symptoms    Pain in the face, jaw, or neck    Pain with jaw movement or chewing    Locking or catching sensation of the jaw    Clicking, popping, or grinding sounds with movement of the TMJ    Headache    Ear pain  Home care  Modest, nonsurgical treatments are a good first step toward relieving symptoms. Try the approaches described below.    Rest the jaw by avoiding crunchy or hard-to-chew foods. Don t eat hard or sticky candies. Soft foods and liquids are easier on the jaw.    Protect your jaw while yawning. If you need to yawn, put your fist under your chin to prevent your mouth from opening up too wide.    To help relieve pain, try applying hot or cold packs to the painful area. Try both hot and cold to find out which works best for you. To make a cold pack, put ice cubes in a plastic bag that seals at the top. Wrap the bag in a clean, thin towel or cloth. Never put ice or an ice pack directly on the skin. If you use hot packs (small towels soaked in hot water), be careful not to burn yourself.    You may take acetaminophen or ibuprofen for pain, unless you were given a different pain medicine. (Note: If you have chronic liver or kidney disease or have ever had a stomach ulcer or gastrointestinal bleeding, talk with your healthcare provider before using these medicines. Also talk to your provider if you are taking medicine to prevent blood clots.) Don t give aspirin to a child  younger than age 19 unless directed by the child s provider. Taking aspirin can put a child at risk for Reye syndrome. This is a rare but very serious disorder that most often affects the brain and the liver.  Reducing stress  If stress seems to be contributing to your symptoms, try to identify the sources of stress in your life. These aren t always obvious. Common stressors include:    Everyday hassles. These include things such as traffic jams, missed appointments, or car trouble.    Major life changes. These can be good, such as a new baby or job promotion. And they can be bad, such as losing a job or losing a loved one.    Overload. The feeling that you have too many responsibilities and can't take care of everything at once.    Helplessness. Feeling like your problems are more than you can solve.  When possible, do something about your sources of stress. See if you can avoid hassles, limit the amount of change in your life at one time, and take breaks when you feel overloaded.  Unfortunately, many stressful situations cannot be avoided. So learning how to manage stress better is very important. Getting regular exercise, eating nutritious, balanced meals, and getting adequate rest all help to make everyday stress more manageable. Certain techniques are also helpful: relaxation and breathing exercises, visualization, biofeedback, meditation, or simply taking some time out to clear your mind. For more information, talk with your healthcare provider.  Follow-up care  Follow up with your healthcare provider, or as advised. Further testing and additional treatment may be required. If changes to your lifestyle do not improve your symptoms, talk with your healthcare provider about other available therapies. These include bite guards for help with teeth grinding, stress management techniques, and more. If stress is an important factor and does not respond to the above simple measures, talk with your healthcare provider  about a referral for stress management.  If X-rays were done, they will be reviewed by a specialist. You will be notified of the results, especially if they affect treatment.  Call 911  Call 911 if any of these occur:    Trouble breathing or swallowing, wheezing    Confusion    Extreme drowsiness or trouble awakening    Fainting or loss of consciousness    Rapid heart rate  When to seek medical advice  Call your healthcare provider right away if any of these occur:    Swollen or red face    Pain gets worse    Neck, mouth, tooth, or throat pain gets worse    Fever of 100.4 F (38 C) or higher, or as directed by your healthcare provider  Date Last Reviewed: 10/1/2017    7342-0101 [employer]. [address] [place] [address], [place] [address]. All rights reserved. This information is not intended as a substitute for professional medical care. Always follow your healthcare professional's instructions.    Follow up with dentist as scheduled.

## 2020-10-18 ENCOUNTER — HOSPITAL ENCOUNTER (EMERGENCY)
Facility: HOSPITAL | Age: 75
Discharge: HOME | End: 2020-10-18
Attending: FAMILY MEDICINE | Admitting: FAMILY MEDICINE
Payer: MEDICARE

## 2020-10-18 ENCOUNTER — APPOINTMENT (OUTPATIENT)
Dept: CT IMAGING | Facility: HOSPITAL | Age: 75
End: 2020-10-18
Attending: FAMILY MEDICINE
Payer: MEDICARE

## 2020-10-18 ENCOUNTER — APPOINTMENT (OUTPATIENT)
Dept: GENERAL RADIOLOGY | Facility: HOSPITAL | Age: 75
End: 2020-10-18
Attending: FAMILY MEDICINE
Payer: MEDICARE

## 2020-10-18 VITALS
TEMPERATURE: 97.9 F | DIASTOLIC BLOOD PRESSURE: 83 MMHG | OXYGEN SATURATION: 98 % | RESPIRATION RATE: 16 BRPM | HEART RATE: 81 BPM | SYSTOLIC BLOOD PRESSURE: 151 MMHG
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
## 2020-10-18 LAB
ALBUMIN UR-MCNC: NEGATIVE MG/DL
ANION GAP SERPL CALCULATED.3IONS-SCNC: 5 MMOL/L (ref 3–14)
APPEARANCE UR: CLEAR
BACTERIA #/AREA URNS HPF: ABNORMAL /HPF
BASOPHILS # BLD AUTO: 0.1 10E9/L (ref 0–0.2)
BASOPHILS NFR BLD AUTO: 0.7 %
BILIRUB UR QL STRIP: NEGATIVE
BUN SERPL-MCNC: 23 MG/DL (ref 7–30)
CALCIUM SERPL-MCNC: 8.6 MG/DL (ref 8.5–10.1)
CHLORIDE SERPL-SCNC: 109 MMOL/L (ref 94–109)
CO2 SERPL-SCNC: 24 MMOL/L (ref 20–32)
COLOR UR AUTO: YELLOW
CREAT SERPL-MCNC: 0.82 MG/DL (ref 0.66–1.25)
DIFFERENTIAL METHOD BLD: ABNORMAL
EOSINOPHIL # BLD AUTO: 0.1 10E9/L (ref 0–0.7)
EOSINOPHIL NFR BLD AUTO: 1.2 %
ERYTHROCYTE [DISTWIDTH] IN BLOOD BY AUTOMATED COUNT: 13.2 % (ref 10–15)
GFR SERPL CREATININE-BSD FRML MDRD: 87 ML/MIN/{1.73_M2}
GLUCOSE SERPL-MCNC: 186 MG/DL (ref 70–99)
GLUCOSE UR STRIP-MCNC: NEGATIVE MG/DL
HCT VFR BLD AUTO: 40.2 % (ref 40–53)
HGB BLD-MCNC: 13.2 G/DL (ref 13.3–17.7)
HGB UR QL STRIP: ABNORMAL
IMM GRANULOCYTES # BLD: 0 10E9/L (ref 0–0.4)
IMM GRANULOCYTES NFR BLD: 0.3 %
KETONES UR STRIP-MCNC: NEGATIVE MG/DL
LEUKOCYTE ESTERASE UR QL STRIP: NEGATIVE
LYMPHOCYTES # BLD AUTO: 1.9 10E9/L (ref 0.8–5.3)
LYMPHOCYTES NFR BLD AUTO: 27.8 %
MCH RBC QN AUTO: 30.3 PG (ref 26.5–33)
MCHC RBC AUTO-ENTMCNC: 32.8 G/DL (ref 31.5–36.5)
MCV RBC AUTO: 92 FL (ref 78–100)
MONOCYTES # BLD AUTO: 0.5 10E9/L (ref 0–1.3)
MONOCYTES NFR BLD AUTO: 6.5 %
MUCOUS THREADS #/AREA URNS LPF: PRESENT /LPF
NEUTROPHILS # BLD AUTO: 4.4 10E9/L (ref 1.6–8.3)
NEUTROPHILS NFR BLD AUTO: 63.5 %
NITRATE UR QL: NEGATIVE
NRBC # BLD AUTO: 0 10*3/UL
NRBC BLD AUTO-RTO: 0 /100
PH UR STRIP: 5.5 PH (ref 4.7–8)
PLATELET # BLD AUTO: 238 10E9/L (ref 150–450)
POTASSIUM SERPL-SCNC: 4.3 MMOL/L (ref 3.4–5.3)
RBC # BLD AUTO: 4.36 10E12/L (ref 4.4–5.9)
RBC #/AREA URNS AUTO: 1 /HPF (ref 0–2)
SODIUM SERPL-SCNC: 138 MMOL/L (ref 133–144)
SOURCE: ABNORMAL
SP GR UR STRIP: 1.02 (ref 1–1.03)
SQUAMOUS #/AREA URNS AUTO: 0 /HPF (ref 0–1)
UROBILINOGEN UR STRIP-MCNC: NORMAL MG/DL (ref 0–2)
WBC # BLD AUTO: 7 10E9/L (ref 4–11)
WBC #/AREA URNS AUTO: 0 /HPF (ref 0–5)

## 2020-10-18 PROCEDURE — 73030 X-RAY EXAM OF SHOULDER: CPT | Mod: RT

## 2020-10-18 PROCEDURE — 71045 X-RAY EXAM CHEST 1 VIEW: CPT

## 2020-10-18 PROCEDURE — 99284 EMERGENCY DEPT VISIT MOD MDM: CPT | Mod: 25

## 2020-10-18 PROCEDURE — 70450 CT HEAD/BRAIN W/O DYE: CPT

## 2020-10-18 PROCEDURE — 12011 RPR F/E/E/N/L/M 2.5 CM/<: CPT

## 2020-10-18 PROCEDURE — 85025 COMPLETE CBC W/AUTO DIFF WBC: CPT | Performed by: FAMILY MEDICINE

## 2020-10-18 PROCEDURE — 12011 RPR F/E/E/N/L/M 2.5 CM/<: CPT | Performed by: FAMILY MEDICINE

## 2020-10-18 PROCEDURE — 99283 EMERGENCY DEPT VISIT LOW MDM: CPT | Mod: 25 | Performed by: FAMILY MEDICINE

## 2020-10-18 PROCEDURE — 81001 URINALYSIS AUTO W/SCOPE: CPT | Performed by: FAMILY MEDICINE

## 2020-10-18 PROCEDURE — 80048 BASIC METABOLIC PNL TOTAL CA: CPT | Performed by: FAMILY MEDICINE

## 2020-10-18 NOTE — ED AVS SNAPSHOT
[facility]  [address] [address]  Phone: [phone]                                    Augie Hernandez   MRN: [id number]    Department: [facility]   Date of Visit: 10/18/2020           After Visit Summary Signature Page    I have received my discharge instructions, and my questions have been answered. I have discussed any challenges I see with this plan with the nurse or doctor.    ..........................................................................................................................................  Patient/Patient Representative Signature      ..........................................................................................................................................  Patient Representative Print Name and Relationship to Patient    ..................................................               ................................................  Date                                   Time    ..........................................................................................................................................  Reviewed by Signature/Title    ...................................................              ..............................................  Date                                               Time          22EPIC Rev 08/18

## 2020-10-18 NOTE — ED PROVIDER NOTES
History     Chief Complaint   Patient presents with     Fall     slipped while shoveling snow. hit head. positive loss of consciousness. forgetfulness. no blood thinners. also right shoulder pain      HPI  Augie Hernandez is a 75 year old male who is not on blood thinners.  Patient was shoveling snow today and slipped on the ice, falling onto his right side.  Hit his head, and thinks he may have lost consciousness.      Also, his right shoulder hurts, but is getting better here without therapy.    No rib pain.  He has an old right rib fracture.  No SOB.    No other significant injury.    He feels back to normal now.      Allergies:  Allergies   Allergen Reactions     Duloxetine Hydrochloride Nausea     Cymbalta     Azithromycin Hives and Rash     Zithromax       Problem List:    Patient Active Problem List    Diagnosis Date Noted     ACP (advance care planning) 06/21/2016     Priority: Medium     Advance Care Planning 10/5/2016: Receipt of ACP document:  Received: Health Care Directive which was witnessed or notarized on 12/29/09.  Document previously scanned on 8/1/16.  Validation form completed and sent to be scanned.  Code Status reflects choices in most recent ACP document.  Confirmed/documented designated decision maker(s).  Added by [place]   Advance Care Planning Liaison  Advance Care Planning 6/21/2016: ACP Review of Chart / Resources Provided:  Reviewed chart for advance care plan.  Augie Hernandez has an up to date advance care plan on file.  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     Nonallopathic lesion of thoracic region, not else 1/28/2002     Osteoarthritis 11/1/2011      Prostatitis 2011     Radiculopathy of lumbar region 2011     Tobacco abuse 2011       Past Surgical History:    Past Surgical History:   Procedure Laterality Date     OPEN REDUCTION INTERNAL FIXATION CLAVICLE Left 2016    Procedure: OPEN REDUCTION INTERNAL FIXATION CLAVICLE;  Surgeon: Dipesh Morris MD;  Location: [facility]     ORTHOPEDIC SURGERY      left rotator cuff repair       Family History:    Family History   Problem Relation Age of Onset     Cancer Father          from some form of sinus cancer     C.A.D. Brother          with Coronary Artery Disease at age 51       Social History:  Marital Status:   [2]  Social History     Tobacco Use     Smoking status: Current Every Day Smoker     Packs/day: 0.50     Types: Cigarettes     Smokeless tobacco: Never Used   Substance Use Topics     Alcohol use: Yes     Alcohol/week: 0.0 standard drinks     Comment: 17- a few beers 4-5 times a week.     Drug use: No        Medications:    No current outpatient medications on file.        Review of Systems  No fevers, chills, HEENT, chest pain, SOB, abdominal pain  Physical Exam   BP: (!) 147/108  Pulse: 106  Temp: 97.2  F (36.2  C)  Resp: 18  SpO2: 98 %      Physical Exam  Well man.  Stable vitals.  HR 90 through most of his stay.  Has a 1cm laceration to the right forehead, clean.  No hemotympanum.  Speech, language, memory seem intact.  No one-sided neurologic deficits.  He is ambulatory.  No other facial trauma.   Heart reg.  Lungs clear.  No ribcage tenderness with compression.  He moves his right shoulder normally.  No apparent humeral shaft or clavicle tenderness.  No apparent abdominal tenderness or pelvic tenderness or lower extremity tenderness.    CT head normal.  CXR normal  Shoulder negative; rib finding looks chronic to me.    ED Course        Procedures               Critical Care time:  none               Results for orders placed or performed during the hospital  encounter of 10/18/20 (from the past 24 hour(s))   CBC with platelets differential   Result Value Ref Range    WBC 7.0 4.0 - 11.0 10e9/L    RBC Count 4.36 (L) 4.4 - 5.9 10e12/L    Hemoglobin 13.2 (L) 13.3 - 17.7 g/dL    Hematocrit 40.2 40.0 - 53.0 %    MCV 92 78 - 100 fl    MCH 30.3 26.5 - 33.0 pg    MCHC 32.8 31.5 - 36.5 g/dL    RDW 13.2 10.0 - 15.0 %    Platelet Count 238 150 - 450 10e9/L    Diff Method Automated Method     % Neutrophils 63.5 %    % Lymphocytes 27.8 %    % Monocytes 6.5 %    % Eosinophils 1.2 %    % Basophils 0.7 %    % Immature Granulocytes 0.3 %    Nucleated RBCs 0 0 /100    Absolute Neutrophil 4.4 1.6 - 8.3 10e9/L    Absolute Lymphocytes 1.9 0.8 - 5.3 10e9/L    Absolute Monocytes 0.5 0.0 - 1.3 10e9/L    Absolute Eosinophils 0.1 0.0 - 0.7 10e9/L    Absolute Basophils 0.1 0.0 - 0.2 10e9/L    Abs Immature Granulocytes 0.0 0 - 0.4 10e9/L    Absolute Nucleated RBC 0.0    Basic metabolic panel   Result Value Ref Range    Sodium 138 133 - 144 mmol/L    Potassium 4.3 3.4 - 5.3 mmol/L    Chloride 109 94 - 109 mmol/L    Carbon Dioxide 24 20 - 32 mmol/L    Anion Gap 5 3 - 14 mmol/L    Glucose 186 (H) 70 - 99 mg/dL    Urea Nitrogen 23 7 - 30 mg/dL    Creatinine 0.82 0.66 - 1.25 mg/dL    GFR Estimate 87 >60 mL/min/[1.73_m2]    GFR Estimate If Black >90 >60 mL/min/[1.73_m2]    Calcium 8.6 8.5 - 10.1 mg/dL   XR Chest 1 View    Narrative    Procedure:XR CHEST 1 VW    Clinical history:Male, 75 years, fall    Technique: Single view was obtained.    Comparison: 5/19/2016    Findings: The cardiac silhouette is normal. The pulmonary vasculature  is normal.    The lungs all hyperinflated however appear to be clear. Bony  structures demonstrate postoperative changes of the left clavicle and  no distinct evidence of an acute fracture.      Impression    Impression:   Hyperinflation of the lungs without evidence of acute abnormality.  Postoperative changes of the left clavicle.    TANG ABREU MD   XR Shoulder  Right G/E 3 Views    Narrative    Exam: XR SHOULDER RT G/E 3 VW     History:Male, age 75 years, fall    Comparison:  None    Technique: Four views are submitted.    Findings: Bones are normally mineralized. No evidence of acute or  subacute fracture within the shoulder.  No evidence of dislocation.   There is elevation of the right humeral head suggesting chronic  rotator cuff tear. Nondisplaced fractures seen in the dorsal lateral  aspect the right ninth rib.           Impression    Impression:  Nondisplaced fracture involving the dorsal lateral aspect of the right  ninth rib. No pneumothorax. No acute abnormality of the right  shoulder.     Findings suggesting chronic rotator cuff tear.    TANG ABREU MD   CT Head w/o Contrast    Narrative    PROCEDURE: CT HEAD W/O CONTRAST   10/18/2020 12:28 PM    HISTORY:Male, age,  75 years, , , Altered level of consciousness  (LOC), unexplained    COMPARISON:None    TECHNIQUE: CT of the brain without contrast.    FINDINGS: Ventricles and sulci are prominent in size and shape. Gray  and white matter demonstrate scattered areas of decreased density.    There is no evidence of mass, mass effect or midline shift. No  evidence of acute hemorrhage.    Bones demonstrate no evidence of an acute fracture. There is mild  mucosal thickening involving the mid ethmoid air cells on the right.       Impression    IMPRESSION:   No evidence of acute hemorrhage or mass.  No acute fracture.     Mild generalized atrophy.    Nonspecific white matter changes suggesting small vessel disease.    Mild mucosal thickening involving the mid right ethmoid air cells.    TANG ABREU MD   UA with Microscopic   Result Value Ref Range    Color Urine Yellow     Appearance Urine Clear     Glucose Urine Negative NEG^Negative mg/dL    Bilirubin Urine Negative NEG^Negative    Ketones Urine Negative NEG^Negative mg/dL    Specific Gravity Urine 1.022 1.003 - 1.035    Blood Urine Trace (A) NEG^Negative    pH  Urine 5.5 4.7 - 8.0 pH    Protein Albumin Urine Negative NEG^Negative mg/dL    Urobilinogen mg/dL Normal 0.0 - 2.0 mg/dL    Nitrite Urine Negative NEG^Negative    Leukocyte Esterase Urine Negative NEG^Negative    Source Midstream Urine     WBC Urine 0 0 - 5 /HPF    RBC Urine 1 0 - 2 /HPF    Bacteria Urine None (A) NEG^Negative /HPF    Squamous Epithelial /HPF Urine 0 0 - 1 /HPF    Mucous Urine Present (A) NEG^Negative /LPF       Medications - No data to display    Assessments & Plan (with Medical Decision Making)     I have reviewed the nursing notes.    I have reviewed the findings, diagnosis, plan and need for follow up with the patient.   universal precautions utilized.  Risks/benefits discussed.  Lidocaine with epi used to numb the laceration.  I used two 5.0 ethilon sutures to approximate his wound on the right forehead.  Prior to this, it was irrigated and cleaned, found to be free of debris.  No complications and he tolerated procedure without incident.  Sutures out in 5 days.    Regarding his fall, no evidence of intracranial bleed and he appears to be thinking clearly.  I do not feel he needs hospitalization.  He will return to ER with any near-future concerns, or if he develops excessive tiredness and headache suggesting a latent subdural bleed.    He has no other orthopedic injuries; the rib finding appears to be not acute.    Recommend follow up to ER with any new concerns.    In general, though, there do not appear to be any significant traumatic injuries today from his slip and fall.      New Prescriptions    No medications on file       Final diagnoses:   Fall, initial encounter       10/18/2020   [facility]     Dawit Peace MD  10/18/20 [id number]

## 2023-02-09 ENCOUNTER — OFFICE VISIT (OUTPATIENT)
Dept: FAMILY MEDICINE | Facility: OTHER | Age: 78
End: 2023-02-09
Attending: FAMILY MEDICINE
Payer: COMMERCIAL

## 2023-02-09 VITALS
HEART RATE: 98 BPM | SYSTOLIC BLOOD PRESSURE: 150 MMHG | RESPIRATION RATE: 20 BRPM | BODY MASS INDEX: 17.34 KG/M2 | DIASTOLIC BLOOD PRESSURE: 76 MMHG | TEMPERATURE: 98.1 F | OXYGEN SATURATION: 97 % | HEIGHT: 67 IN | WEIGHT: 110.5 LBS

## 2023-02-09 DIAGNOSIS — L98.9 SKIN LESION OF FACE: Primary | ICD-10-CM

## 2023-02-09 PROCEDURE — G0463 HOSPITAL OUTPT CLINIC VISIT: HCPCS

## 2023-02-09 PROCEDURE — 99202 OFFICE O/P NEW SF 15 MIN: CPT | Performed by: FAMILY MEDICINE

## 2023-02-09 ASSESSMENT — PAIN SCALES - GENERAL: PAINLEVEL: NO PAIN (0)

## 2023-02-09 NOTE — PROGRESS NOTES
"  Assessment & Plan     Skin lesion of face  Concern for SCC, referring to dermatology.  - Adult Dermatology Referral; Future      BP on higher side, he's going to observe, scheduling AWV in the near future for preventative care  .    MAMIE ANDERSON, DO  [facility]    Brooklyn Ghosh is a 77 year old, presenting for the following health issues:  Establish Care and Derm Problem      HPI     Skin Lesion  Onset/Duration: Last Fall  Description : Arnaudoupadma was a pimple, squeezed it, now hasn't gone away.  Location: Left side temple  Color: brown and pink  Border description: irregular border, raised, red, scaly  Character: round, raised, flakey, draining  Itching: no  Bleeding:  YES  Intensity:  moderate  Progression of Symptoms:  worsening  Accompanying signs and symptoms:   Bleeding: YES  Scaling: YES  Excessive sun exposure/tanning: No  Sunscreen used: No  History:           Any previous history of skin cancer: No  Any family history of melanoma: No  Previous episodes of similar lesion: No  Precipitating or alleviating factors: picking  Therapies tried and outcome: Neosporin        Review of Systems   Constitutional: Negative for fever.   HENT: Negative for sore throat.    Hematological: Negative for adenopathy.            Objective    BP (!) 150/76   Pulse 98   Temp 98.1  F (36.7  C) (Tympanic)   Resp 20   Ht 1.695 m (5' 6.75\")   Wt 50.1 kg (110 lb 8 oz)   SpO2 97%   BMI 17.44 kg/m    Body mass index is 17.44 kg/m .  Physical Exam  Constitutional:       General: He is not in acute distress.     Appearance: Normal appearance.   Cardiovascular:      Rate and Rhythm: Normal rate and regular rhythm.      Heart sounds: Normal heart sounds. No murmur heard.  Pulmonary:      Effort: Pulmonary effort is normal.      Breath sounds: Normal breath sounds. No wheezing.   Skin:     Comments: See image below   Neurological:      Mental Status: He is alert and oriented to person, place, and time.      "

## 2023-02-15 ENCOUNTER — TELEPHONE (OUTPATIENT)
Dept: FAMILY MEDICINE | Facility: OTHER | Age: 78
End: 2023-02-15

## 2023-02-15 ASSESSMENT — ENCOUNTER SYMPTOMS
SORE THROAT: 0
ADENOPATHY: 0
FEVER: 0

## 2023-02-15 NOTE — TELEPHONE ENCOUNTER
Patient called stating that if he did not hear from [facility] for an appointment. States that he was to let provider know today if he didn't hear anything.  He has not heard anything yet.

## 2023-02-21 ENCOUNTER — TRANSFERRED RECORDS (OUTPATIENT)
Dept: HEALTH INFORMATION MANAGEMENT | Facility: CLINIC | Age: 78
End: 2023-02-21

## 2023-03-27 ENCOUNTER — TRANSFERRED RECORDS (OUTPATIENT)
Dept: HEALTH INFORMATION MANAGEMENT | Facility: CLINIC | Age: 78
End: 2023-03-27

## 2023-04-11 ENCOUNTER — LAB (OUTPATIENT)
Dept: FAMILY MEDICINE | Facility: OTHER | Age: 78
End: 2023-04-11

## 2023-04-11 ENCOUNTER — OFFICE VISIT (OUTPATIENT)
Dept: FAMILY MEDICINE | Facility: OTHER | Age: 78
End: 2023-04-11
Attending: FAMILY MEDICINE
Payer: COMMERCIAL

## 2023-04-11 VITALS
HEIGHT: 67 IN | SYSTOLIC BLOOD PRESSURE: 134 MMHG | BODY MASS INDEX: 17.67 KG/M2 | OXYGEN SATURATION: 96 % | RESPIRATION RATE: 18 BRPM | TEMPERATURE: 98.3 F | DIASTOLIC BLOOD PRESSURE: 72 MMHG | HEART RATE: 80 BPM | WEIGHT: 112.6 LBS

## 2023-04-11 DIAGNOSIS — Z00.00 ENCOUNTER FOR MEDICARE ANNUAL WELLNESS EXAM: Primary | ICD-10-CM

## 2023-04-11 DIAGNOSIS — Z23 ENCOUNTER FOR VACCINATION: ICD-10-CM

## 2023-04-11 DIAGNOSIS — Z12.11 SCREEN FOR COLON CANCER: ICD-10-CM

## 2023-04-11 DIAGNOSIS — E78.5 DYSLIPIDEMIA: ICD-10-CM

## 2023-04-11 DIAGNOSIS — Z87.891 PERSONAL HISTORY OF TOBACCO USE: ICD-10-CM

## 2023-04-11 DIAGNOSIS — Z71.6 TOBACCO ABUSE COUNSELING: ICD-10-CM

## 2023-04-11 DIAGNOSIS — Z13.220 ENCOUNTER FOR LIPID SCREENING FOR CARDIOVASCULAR DISEASE: ICD-10-CM

## 2023-04-11 DIAGNOSIS — Z13.6 ENCOUNTER FOR LIPID SCREENING FOR CARDIOVASCULAR DISEASE: ICD-10-CM

## 2023-04-11 DIAGNOSIS — Z12.5 SCREENING FOR MALIGNANT NEOPLASM OF PROSTATE: ICD-10-CM

## 2023-04-11 DIAGNOSIS — Z11.59 NEED FOR HEPATITIS C SCREENING TEST: ICD-10-CM

## 2023-04-11 DIAGNOSIS — Z13.1 SCREENING FOR DIABETES MELLITUS: ICD-10-CM

## 2023-04-11 LAB
ALBUMIN SERPL BCG-MCNC: 4.1 G/DL (ref 3.5–5.2)
ALP SERPL-CCNC: 70 U/L (ref 40–129)
ALT SERPL W P-5'-P-CCNC: 22 U/L (ref 10–50)
ANION GAP SERPL CALCULATED.3IONS-SCNC: 12 MMOL/L (ref 7–15)
AST SERPL W P-5'-P-CCNC: 31 U/L (ref 10–50)
BILIRUB SERPL-MCNC: 0.3 MG/DL
BUN SERPL-MCNC: 17.1 MG/DL (ref 8–23)
CALCIUM SERPL-MCNC: 9.7 MG/DL (ref 8.8–10.2)
CHLORIDE SERPL-SCNC: 105 MMOL/L (ref 98–107)
CHOLEST SERPL-MCNC: 238 MG/DL
CREAT SERPL-MCNC: 0.84 MG/DL (ref 0.67–1.17)
DEPRECATED HCO3 PLAS-SCNC: 24 MMOL/L (ref 22–29)
GFR SERPL CREATININE-BSD FRML MDRD: 90 ML/MIN/1.73M2
GLUCOSE SERPL-MCNC: 97 MG/DL (ref 70–99)
HDLC SERPL-MCNC: 115 MG/DL
LDLC SERPL CALC-MCNC: 110 MG/DL
NONHDLC SERPL-MCNC: 123 MG/DL
POTASSIUM SERPL-SCNC: 4.4 MMOL/L (ref 3.4–5.3)
PROT SERPL-MCNC: 7.2 G/DL (ref 6.4–8.3)
PSA SERPL DL<=0.01 NG/ML-MCNC: 1.6 NG/ML (ref 0–6.5)
SODIUM SERPL-SCNC: 141 MMOL/L (ref 136–145)
TRIGL SERPL-MCNC: 64 MG/DL

## 2023-04-11 PROCEDURE — 80053 COMPREHEN METABOLIC PANEL: CPT | Mod: ZL | Performed by: FAMILY MEDICINE

## 2023-04-11 PROCEDURE — 80061 LIPID PANEL: CPT | Mod: ZL | Performed by: FAMILY MEDICINE

## 2023-04-11 PROCEDURE — 86803 HEPATITIS C AB TEST: CPT | Mod: ZL | Performed by: FAMILY MEDICINE

## 2023-04-11 PROCEDURE — G0438 PPPS, INITIAL VISIT: HCPCS | Performed by: FAMILY MEDICINE

## 2023-04-11 PROCEDURE — 90677 PCV20 VACCINE IM: CPT

## 2023-04-11 PROCEDURE — G0296 VISIT TO DETERM LDCT ELIG: HCPCS | Performed by: FAMILY MEDICINE

## 2023-04-11 PROCEDURE — G0103 PSA SCREENING: HCPCS | Mod: ZL | Performed by: FAMILY MEDICINE

## 2023-04-11 PROCEDURE — 36415 COLL VENOUS BLD VENIPUNCTURE: CPT | Mod: ZL | Performed by: FAMILY MEDICINE

## 2023-04-11 PROCEDURE — 99406 BEHAV CHNG SMOKING 3-10 MIN: CPT | Performed by: FAMILY MEDICINE

## 2023-04-11 ASSESSMENT — ENCOUNTER SYMPTOMS
DIZZINESS: 0
ARTHRALGIAS: 1
EYE PAIN: 0
JOINT SWELLING: 0
HEARTBURN: 0
MYALGIAS: 0
CONSTIPATION: 0
HEMATOCHEZIA: 0
SHORTNESS OF BREATH: 0
NAUSEA: 0
PALPITATIONS: 0
HEMATURIA: 0
ABDOMINAL PAIN: 0
HEADACHES: 0
FREQUENCY: 0
DIARRHEA: 0
FEVER: 0
NERVOUS/ANXIOUS: 0
SORE THROAT: 0
PARESTHESIAS: 0
COUGH: 1
WEAKNESS: 1
CHILLS: 0
DYSURIA: 0

## 2023-04-11 ASSESSMENT — PAIN SCALES - GENERAL: PAINLEVEL: MODERATE PAIN (4)

## 2023-04-11 ASSESSMENT — ACTIVITIES OF DAILY LIVING (ADL): CURRENT_FUNCTION: NO ASSISTANCE NEEDED

## 2023-04-11 NOTE — PATIENT INSTRUCTIONS
Patient Education   Personalized Prevention Plan  You are due for the preventive services outlined below.  Your care team is available to assist you in scheduling these services.  If you have already completed any of these items, please share that information with your care team to update in your medical record.  Health Maintenance Due   Topic Date Due     Pneumococcal Vaccine (1 - PCV) Never done     Hepatitis C Screening  Never done     Cholesterol Lab  Never done     LUNG CANCER SCREENING  05/17/2017     Zoster (Shingles) Vaccine (2 of 2) 10/03/2022        Lung Cancer Screening   Frequently Asked Questions  If you are at high-risk for lung cancer, getting screened with low-dose computed tomography (LDCT) every year can help save your life. This handout offers answers to some of the most common questions about lung cancer screening. If you have other questions, please call [phone] ([phone]).     What is it?  Lung cancer screening uses special X-ray technology to create an image of your lung tissue. The exam is quick and easy and takes less than 10 seconds. We don t give you any medicine or use any needles. You can eat before and after the exam. You don t need to change your clothes as long as the clothing on your chest doesn t contain metal. But, you do need to be able to hold your breath for at least 6 seconds during the exam.    What is the goal of lung cancer screening?  The goal of lung cancer screening is to save lives. Many times, lung cancer is not found until a person starts having physical symptoms. Lung cancer screening can help detect lung cancer in the earliest stages when it may be easier to treat.    Who should be screened for lung cancer?  We suggest lung cancer screening for anyone who is at high-risk for lung cancer. You are in the high-risk group if you:      are between the ages of 55 and 79, and    have smoked at least 1 pack of cigarettes a day for 20 or more years, and     still smoke or have quit within the past 15 years.    However, if you have a new cough or shortness of breath, you should talk to your doctor before being screened.    Why does it matter if I have symptoms?  Certain symptoms can be a sign that you have a condition in your lungs that should be checked and treated by your doctor. These symptoms include fever, chest pain, a new or changing cough, shortness of breath that you have never felt before, coughing up blood or unexplained weight loss. Having any of these symptoms can greatly affect the results of lung cancer screening.       Should all smokers get an LDCT lung cancer screening exam?  It depends. Lung cancer screening is for a very specific group of men and women who have a history of heavy smoking over a long period of time (see  Who should be screened for lung cancer  above).  I am in the high-risk group, but have been diagnosed with cancer in the past. Is LDCT lung cancer screening right for me?  In some cases, you should not have LDCT lung screening, such as when your doctor is already following your cancer with CT scan studies. Your doctor will help you decide if LDCT lung screening is right for you.  Do I need to have a screening exam every year?  Yes. If you are in the high-risk group described earlier, you should get an LDCT lung cancer screening exam every year until you are 79, or are no longer willing or able to undergo screening and possible procedures to diagnose and treat lung cancer.  How effective is LDCT at preventing death from lung cancer?  Studies have shown that LDCT lung cancer screening can lower the risk of death from lung cancer by 20 percent in people who are at high-risk.  What are the risks?  There are some risks and limitations of LDCT lung cancer screening. We want to make sure you understand the risks and benefits, so please let us know if you have any questions. Your doctor may want to talk with you more about these risks.     Radiation exposure: As with any exam that uses radiation, there is a very small increased risk of cancer. The amount of radiation in LDCT is small--about the same amount a person would get from a mammogram. Your doctor orders the exam when he or she feels the potential benefits outweigh the risks.    False negatives: No test is perfect, including LDCT. It is possible that you may have a medical condition, including lung cancer, that is not found during your exam. This is called a false negative result.    False positives and more testing: LDCT very often finds something in the lung that could be cancer, but in fact is not. This is called a false positive result. False positive tests often cause anxiety. To make sure these findings are not cancer, you may need to have more tests. These tests will be done only if you give us permission. Sometimes patients need a treatment that can have side effects, such as a biopsy. For more information on false positives, see  What can I expect from the results?     Findings not related to lung cancer: Your LDCT exam also takes pictures of areas of your body next to your lungs. In a very small number of cases, the CT scan will show an abnormal finding in one of these areas, such as your kidneys, adrenal glands, liver or thyroid. This finding may not be serious, but you may need more tests. Your doctor can help you decide what other tests you may need, if any.  What can I expect from the results?  About 1 out of 4 LDCT exams will find something that may need more tests. Most of the time, these findings are lung nodules. Lung nodules are very small collections of tissue in the lung. These nodules are very common, and the vast majority--more than 97 percent--are not cancer (benign). Most are normal lymph nodes or small areas of scarring from past infections.  But, if a small lung nodule is found to be cancer, the cancer can be cured more than 90 percent of the time. To know if the  nodule is cancer, we may need to get more images before your next yearly screening exam. If the nodule has suspicious features (for example, it is large, has an odd shape or grows over time), we will refer you to a specialist for further testing.  Will my doctor also get the results?  Yes. Your doctor will get a copy of your results.  Is it okay to keep smoking now that there s a cancer screening exam?  No. Tobacco is one of the strongest cancer-causing agents. It causes not only lung cancer, but other cancers and cardiovascular (heart) diseases as well. The damage caused by smoking builds over time. This means that the longer you smoke, the higher your risk of disease. While it is never too late to quit, the sooner you quit, the better.  Where can I find help to quit smoking?  The best way to prevent lung cancer is to stop smoking. If you have already quit smoking, congratulations and keep it up! For help on quitting smoking, please call [employer] at 4-491-QUITNOW (1-489.823.9994) or the [employer] at 1-489.734.8408 to find local resources near you.  One-on-one health coaching:  If you d prefer to work individually with a health care provider on tobacco cessation, we offer:      Medication Therapy Management:  Our specially trained pharmacists work closely with you and your doctor to help you quit smoking.  Call [phone] or [phone] (toll free).

## 2023-04-11 NOTE — PROGRESS NOTES
"SUBJECTIVE:   Augie is a 77 year old who presents for Preventive Visit.  Patient has been advised of split billing requirements and indicates understanding: Yes  Are you in the first 12 months of your Medicare coverage?  No    Healthy Habits:     In general, how would you rate your overall health?  Good    Frequency of exercise:  6-7 days/week    Duration of exercise:  Greater than 60 minutes    Do you usually eat at least 4 servings of fruit and vegetables a day, include whole grains    & fiber and avoid regularly eating high fat or \"junk\" foods?  No    Taking medications regularly:  Yes    Medication side effects:  Not applicable    Ability to successfully perform activities of daily living:  No assistance needed    Home Safety:  No safety concerns identified    Hearing Impairment:  No hearing concerns    In the past 6 months, have you been bothered by leaking of urine?  No    In general, how would you rate your overall mental or emotional health?  Excellent      PHQ-2 Total Score: 0    Additional concerns today:  No      Have you ever done Advance Care Planning? (For example, a Health Directive, POLST, or a discussion with a medical provider or your loved ones about your wishes): No, advance care planning information given to patient to review.  Patient plans to discuss their wishes with loved ones or provider.         Fall risk  Fallen 2 or more times in the past year?: No  Any fall with injury in the past year?: No    Cognitive Screening   1) Repeat 3 items (Leader, Season, Table)    2) Clock draw: NORMAL  3) 3 item recall: Recalls NO objects   Results: NORMAL clock, 1-2 items recalled: COGNITIVE IMPAIRMENT LESS LIKELY  Repeat recall (daughter, heaven, mountain) = 3/3    Mini-CogTM Copyright EMILEE Bell. Licensed by the author for use in [facility]; reprinted with permission ([email or web address]). All rights reserved.      Do you have sleep apnea, excessive snoring or daytime drowsiness?: no    Reviewed and " updated as needed this visit by clinical staff   Tobacco  Allergies  Meds   Med Hx  Surg Hx  Fam Hx          Reviewed and updated as needed this visit by Provider   Tobacco  Allergies  Meds   Med Hx  Surg Hx  Fam Hx         Social History     Tobacco Use     Smoking status: Every Day     Packs/day: 0.50     Years: 55.00     Pack years: 27.50     Types: Cigarettes     Start date: 1967     Smokeless tobacco: Never     Tobacco comments:     4/11/23 - current 1/4 ppd, was 1 ppd at max   Vaping Use     Vaping status: Not on file   Substance Use Topics     Alcohol use: Yes     Alcohol/week: 10.0 standard drinks of alcohol     Types: 10 Cans of beer per week             4/11/2023    10:32 AM   Alcohol Use   Prescreen: >3 drinks/day or >7 drinks/week? No      States 2-3 beer maybe 5 nights per week.     Do you have a current opioid prescription? No  Do you use any other controlled substances or medications that are not prescribed by a provider? None        Current providers sharing in care for this patient include:   Patient Care Team:  Dipesh Vargas DO as PCP - General (Family Medicine)  Dipesh Vargas DO as Assigned PCP   Eyes - [facility] in [place].  Dentist - Alphonso Lehman    The following health maintenance items are reviewed in Epic and correct as of today:  Health Maintenance   Topic Date Due     Pneumococcal Vaccine: 65+ Years (1 - PCV) Never done     HEPATITIS C SCREENING  Never done     LIPID  Never done     LUNG CANCER SCREENING  05/17/2017     ZOSTER IMMUNIZATION (2 of 2) 10/03/2022     NICOTINE/TOBACCO CESSATION COUNSELING Q 1 YR  04/11/2024     MEDICARE ANNUAL WELLNESS VISIT  04/11/2024     FALL RISK ASSESSMENT  04/11/2024     ADVANCE CARE PLANNING  04/11/2028     DTAP/TDAP/TD IMMUNIZATION (2 - Td or Tdap) 09/23/2028     PHQ-2 (once per calendar year)  Completed     INFLUENZA VACCINE  Completed     COVID-19 Vaccine  Completed     IPV IMMUNIZATION  Aged Out     MENINGITIS IMMUNIZATION   "Aged Out           Review of Systems   Constitutional: Negative for chills and fever.   HENT: Negative for congestion, ear pain, hearing loss and sore throat.    Eyes: Negative for pain and visual disturbance.   Respiratory: Positive for cough. Negative for shortness of breath.    Cardiovascular: Negative for chest pain, palpitations and peripheral edema.   Gastrointestinal: Negative for abdominal pain, constipation, diarrhea, heartburn, hematochezia and nausea.   Genitourinary: Negative for dysuria, frequency, genital sores, hematuria, impotence, penile discharge and urgency.   Musculoskeletal: Positive for arthralgias. Negative for joint swelling and myalgias.   Skin: Negative for rash.   Neurological: Positive for weakness. Negative for dizziness, headaches and paresthesias.   Psychiatric/Behavioral: Negative for mood changes. The patient is not nervous/anxious.      Scratchy throat, occasional dry cough all winter.    OBJECTIVE:   /72   Pulse 80   Temp 98.3  F (36.8  C) (Tympanic)   Resp 18   Ht 1.695 m (5' 6.75\")   Wt 51.1 kg (112 lb 9.6 oz)   SpO2 96%   BMI 17.77 kg/m   Estimated body mass index is 17.77 kg/m  as calculated from the following:    Height as of this encounter: 1.695 m (5' 6.75\").    Weight as of this encounter: 51.1 kg (112 lb 9.6 oz).  Physical Exam  Constitutional:       General: He is not in acute distress.     Appearance: Normal appearance.   HENT:      Head: Normocephalic and atraumatic.      Right Ear: Tympanic membrane, ear canal and external ear normal.      Left Ear: Tympanic membrane, ear canal and external ear normal.      Mouth/Throat:      Mouth: Mucous membranes are moist.      Pharynx: Oropharynx is clear.   Eyes:      Extraocular Movements: Extraocular movements intact.      Conjunctiva/sclera: Conjunctivae normal.      Pupils: Pupils are equal, round, and reactive to light.   Neck:      Vascular: No carotid bruit.   Cardiovascular:      Rate and Rhythm: Normal rate " and regular rhythm.      Heart sounds: Normal heart sounds. No murmur heard.  Pulmonary:      Effort: Pulmonary effort is normal.      Breath sounds: Normal breath sounds. No wheezing, rhonchi or rales.   Abdominal:      General: Bowel sounds are normal.      Palpations: Abdomen is soft.      Tenderness: There is no abdominal tenderness. There is no guarding.      Hernia: No hernia is present.   Musculoskeletal:         General: Normal range of motion.      Cervical back: Normal range of motion.      Right lower leg: No edema.      Left lower leg: No edema.   Lymphadenopathy:      Cervical: No cervical adenopathy.   Skin:     General: Skin is warm and dry.   Neurological:      Mental Status: He is alert and oriented to person, place, and time.      Cranial Nerves: No cranial nerve deficit.      Deep Tendon Reflexes: Reflexes normal.   Psychiatric:         Mood and Affect: Mood normal.         Behavior: Behavior normal.         Thought Content: Thought content normal.         Judgment: Judgment normal.           ASSESSMENT / PLAN:       ICD-10-CM    1. Encounter for Medicare annual wellness exam  Z00.00       2. Need for hepatitis C screening test  Z11.59 Hepatitis C Screen Reflex to HCV RNA Quant and Genotype      3. Encounter for lipid screening for cardiovascular disease  Z13.220 Lipid Profile    Z13.6       4. Screening for diabetes mellitus  Z13.1 Comprehensive metabolic panel (BMP + Alb, Alk Phos, ALT, AST, Total. Bili, TP)      5. Encounter for vaccination  Z23 PNEUMOCOCCAL 20 VALENT CONJUGATE (PREVNAR 20)     CANCELED: Pneumococcal 20 Valent Conjugate (PCV20)      6. Screening for malignant neoplasm of prostate  Z12.5 Prostate spec antigen screen      7. Tobacco abuse counseling  Z71.6       8. Personal history of tobacco use  Z87.891 Prof fee: Shared Decision Making for Lung Cancer Screening     CT Chest Lung Cancer Scrn Low Dose wo     SMOKING CESSATION COUNSELING 3-10 MIN      9. Screen for colon cancer   Z12.11 COLOGUARD([employer])              COUNSELING:  Reviewed preventive health counseling, as reflected in patient instructions       Consider AAA screening for ages 65-75 and smoking history - aged out.       Regular exercise       Healthy diet/nutrition       Vision screening       Dental care       Immunizations    Vaccinated for: Prevnar 20     Shringrix 2/2 needed from pharmacy.       Hepatitis C screening - ordered       Consider lung cancer screening for ages 55-80 years (77 for Medicare) and 20 pack-year smoking history - CT screening ordered.       Colon cancer screening - options discussed, ordered Cologuard.       Prostate cancer screening - ordered PSA.    Discussed smoking cessation, not interested in Rx at this time.    He reports that he has been smoking cigarettes. He started smoking about 56 years ago. He has a 27.50 pack-year smoking history. He has never used smokeless tobacco.  Nicotine/Tobacco Cessation Plan:   Information offered: Patient not interested at this time      Appropriate preventive services were discussed with this patient, including applicable screening as appropriate for cardiovascular disease, diabetes, osteopenia/osteoporosis, and glaucoma.  As appropriate for age/gender, discussed screening for colorectal cancer, prostate cancer, breast cancer, and cervical cancer. Checklist reviewing preventive services available has been given to the patient.    Reviewed patients plan of care and provided an AVS. The Basic Care Plan (routine screening as documented in Health Maintenance) for Augie meets the Care Plan requirement. This Care Plan has been established and reviewed with the Patient.          MAMIE ANDERSON,   [facility] [place]    Identified Health Risks:    I have reviewed Opioid Use Disorder and Substance Use Disorder risk factors and made any needed referrals.     Lung Cancer Screening Shared Decision Making Visit     Augie Hernandez, a 77 year old male, is  eligible for lung cancer screening    History   Smoking Status     Every Day     Packs/day: 0.50     Years: 55.00     Types: Cigarettes     Start date: 1967   Smokeless Tobacco     Never       I have discussed with patient the risks and benefits of screening for lung cancer with low-dose CT.     The risks include:    radiation exposure: one low dose chest CT has as much ionizing radiation as about 15 chest x-rays, or 6 months of background radiation living in [place]      false positives: most findings/nodules are NOT cancer, but some might still require additional diagnostic evaluation, including biopsy    over-diagnosis: some slow growing cancers that might never have been clinically significant will be detected and treated unnecessarily     The benefit of early detection of lung cancer is contingent upon adherence to annual screening or more frequent follow up if indicated.     Furthermore, to benefit from screening, Augie must be willing and able to undergo diagnostic procedures, if indicated. Although no specific guide is available for determining severity of comorbidities, it is reasonable to withhold screening in patients who have greater mortality risk from other diseases.     We did discuss that the best way to prevent lung cancer is to not smoke.    Some patients may value a numeric estimation of lung cancer risk when evaluating if lung cancer screening is right for them, here is one calculator:    ShouldIScreen

## 2023-04-13 ENCOUNTER — HOSPITAL ENCOUNTER (OUTPATIENT)
Dept: CT IMAGING | Facility: HOSPITAL | Age: 78
Discharge: HOME | End: 2023-04-13
Attending: FAMILY MEDICINE | Admitting: FAMILY MEDICINE
Payer: MEDICARE

## 2023-04-13 DIAGNOSIS — Z87.891 PERSONAL HISTORY OF TOBACCO USE: ICD-10-CM

## 2023-04-13 DIAGNOSIS — K86.89 PANCREATIC CALCIFICATION: Primary | ICD-10-CM

## 2023-04-13 LAB — HCV AB SERPL QL IA: NONREACTIVE

## 2023-04-13 PROCEDURE — 71271 CT THORAX LUNG CANCER SCR C-: CPT

## 2023-04-13 NOTE — RESULT ENCOUNTER NOTE
Also showing some heart disease, we need to make sure we are controlling your cholesterol as much as possible.

## 2023-04-20 ENCOUNTER — HOSPITAL ENCOUNTER (OUTPATIENT)
Dept: CT IMAGING | Facility: HOSPITAL | Age: 78
Discharge: HOME | End: 2023-04-20
Attending: FAMILY MEDICINE | Admitting: FAMILY MEDICINE
Payer: MEDICARE

## 2023-04-20 DIAGNOSIS — K86.89 PANCREATIC CALCIFICATION: ICD-10-CM

## 2023-04-20 PROCEDURE — 250N000011 HC RX IP 250 OP 636: Performed by: RADIOLOGY

## 2023-04-20 PROCEDURE — 74177 CT ABD & PELVIS W/CONTRAST: CPT | Mod: MG

## 2023-04-20 PROCEDURE — G1010 CDSM STANSON: HCPCS

## 2023-04-20 RX ORDER — ATORVASTATIN CALCIUM 20 MG/1
20 TABLET, FILM COATED ORAL DAILY
Qty: 90 TABLET | Refills: 2 | Status: SHIPPED | OUTPATIENT
Start: 2023-04-20 | End: 2023-11-08

## 2023-04-20 RX ORDER — IOPAMIDOL 755 MG/ML
17 INJECTION, SOLUTION INTRAVASCULAR ONCE
Status: COMPLETED | OUTPATIENT
Start: 2023-04-20 | End: 2023-04-20

## 2023-04-20 RX ORDER — IOPAMIDOL 755 MG/ML
55 INJECTION, SOLUTION INTRAVASCULAR ONCE
Status: COMPLETED | OUTPATIENT
Start: 2023-04-20 | End: 2023-04-20

## 2023-04-20 RX ADMIN — IOPAMIDOL 55 ML: 755 INJECTION, SOLUTION INTRAVENOUS at 08:40

## 2023-04-20 RX ADMIN — IOPAMIDOL 17 ML: 755 INJECTION, SOLUTION INTRAVENOUS at 08:40

## 2023-05-13 LAB — NONINV COLON CA DNA+OCC BLD SCRN STL QL: NEGATIVE

## 2023-11-07 DIAGNOSIS — E78.5 DYSLIPIDEMIA: ICD-10-CM

## 2023-11-08 RX ORDER — ATORVASTATIN CALCIUM 20 MG/1
20 TABLET, FILM COATED ORAL DAILY
Qty: 90 TABLET | Refills: 1 | Status: SHIPPED | OUTPATIENT
Start: 2023-11-08 | End: 2024-06-11

## 2023-11-08 NOTE — TELEPHONE ENCOUNTER
Atorvastatin (LIPITOR) 20 mg tab      Last Written Prescription Date:  4/20/23  Last Fill Quantity: 90,   # refills: 2  Last Office Visit: 4/11/23

## 2024-04-29 ENCOUNTER — TELEPHONE (OUTPATIENT)
Dept: FAMILY MEDICINE | Facility: OTHER | Age: 79
End: 2024-04-29

## 2024-06-04 ENCOUNTER — OFFICE VISIT (OUTPATIENT)
Dept: FAMILY MEDICINE | Facility: OTHER | Age: 79
End: 2024-06-04
Attending: FAMILY MEDICINE
Payer: COMMERCIAL

## 2024-06-04 ENCOUNTER — ANCILLARY PROCEDURE (OUTPATIENT)
Dept: GENERAL RADIOLOGY | Facility: OTHER | Age: 79
End: 2024-06-04
Attending: FAMILY MEDICINE
Payer: MEDICARE

## 2024-06-04 VITALS
OXYGEN SATURATION: 97 % | DIASTOLIC BLOOD PRESSURE: 68 MMHG | BODY MASS INDEX: 17.12 KG/M2 | HEART RATE: 74 BPM | TEMPERATURE: 97.2 F | WEIGHT: 109.1 LBS | RESPIRATION RATE: 17 BRPM | SYSTOLIC BLOOD PRESSURE: 114 MMHG | HEIGHT: 67 IN

## 2024-06-04 DIAGNOSIS — J01.90 SUBACUTE SINUSITIS, UNSPECIFIED LOCATION: ICD-10-CM

## 2024-06-04 DIAGNOSIS — M54.42 CHRONIC RIGHT-SIDED LOW BACK PAIN WITH LEFT-SIDED SCIATICA: ICD-10-CM

## 2024-06-04 DIAGNOSIS — M85.88 OTHER SPECIFIED DISORDERS OF BONE DENSITY AND STRUCTURE, OTHER SITE: ICD-10-CM

## 2024-06-04 DIAGNOSIS — G89.29 CHRONIC RIGHT-SIDED LOW BACK PAIN WITH LEFT-SIDED SCIATICA: ICD-10-CM

## 2024-06-04 DIAGNOSIS — Z12.5 SCREENING PSA (PROSTATE SPECIFIC ANTIGEN): ICD-10-CM

## 2024-06-04 DIAGNOSIS — R05.2 SUBACUTE COUGH: ICD-10-CM

## 2024-06-04 DIAGNOSIS — Z87.891 PERSONAL HISTORY OF TOBACCO USE: ICD-10-CM

## 2024-06-04 DIAGNOSIS — E78.5 DYSLIPIDEMIA: ICD-10-CM

## 2024-06-04 DIAGNOSIS — Z91.89 FRACTURE RISK ASSESSMENT SCORE (FRAX) INDICATING GREATER THAN 3% RISK FOR HIP FRACTURE: ICD-10-CM

## 2024-06-04 DIAGNOSIS — Z00.00 ENCOUNTER FOR MEDICARE ANNUAL WELLNESS EXAM: Primary | ICD-10-CM

## 2024-06-04 PROCEDURE — G0439 PPPS, SUBSEQ VISIT: HCPCS | Performed by: FAMILY MEDICINE

## 2024-06-04 PROCEDURE — 99214 OFFICE O/P EST MOD 30 MIN: CPT | Mod: 25 | Performed by: FAMILY MEDICINE

## 2024-06-04 PROCEDURE — G0463 HOSPITAL OUTPT CLINIC VISIT: HCPCS | Mod: 25

## 2024-06-04 PROCEDURE — 73502 X-RAY EXAM HIP UNI 2-3 VIEWS: CPT | Mod: TC

## 2024-06-04 PROCEDURE — G0296 VISIT TO DETERM LDCT ELIG: HCPCS | Performed by: FAMILY MEDICINE

## 2024-06-04 PROCEDURE — 72100 X-RAY EXAM L-S SPINE 2/3 VWS: CPT | Mod: TC

## 2024-06-04 RX ORDER — FLUTICASONE PROPIONATE 50 MCG
2 SPRAY, SUSPENSION (ML) NASAL DAILY
Qty: 9.1 G | Refills: 1 | OUTPATIENT
Start: 2024-06-04

## 2024-06-04 RX ORDER — ATORVASTATIN CALCIUM 20 MG/1
20 TABLET, FILM COATED ORAL DAILY
Qty: 90 TABLET | Refills: 1 | Status: CANCELLED | OUTPATIENT
Start: 2024-06-04

## 2024-06-04 RX ORDER — FLUTICASONE PROPIONATE 50 MCG
2 SPRAY, SUSPENSION (ML) NASAL DAILY
Qty: 9.1 ML | Refills: 1 | Status: SHIPPED | OUTPATIENT
Start: 2024-06-04 | End: 2024-08-01

## 2024-06-04 SDOH — HEALTH STABILITY: PHYSICAL HEALTH: ON AVERAGE, HOW MANY DAYS PER WEEK DO YOU ENGAGE IN MODERATE TO STRENUOUS EXERCISE (LIKE A BRISK WALK)?: 7 DAYS

## 2024-06-04 ASSESSMENT — SOCIAL DETERMINANTS OF HEALTH (SDOH): HOW OFTEN DO YOU GET TOGETHER WITH FRIENDS OR RELATIVES?: ONCE A WEEK

## 2024-06-04 ASSESSMENT — PAIN SCALES - GENERAL: PAINLEVEL: EXTREME PAIN (9)

## 2024-06-04 NOTE — TELEPHONE ENCOUNTER
fluticasone (FLONASE) 50 MCG/ACT nasal spray      Last Written Prescription Date:  6-4-24  Last Fill Quantity: 9.1ml,   # refills: 1  Last Office Visit:   Future Office visit:       Routing refill request to provider for review/approval because:  Just filled

## 2024-06-04 NOTE — PROGRESS NOTES
Preventive Care Visit  [facility]  MAMIE ANDERSON DO, Family Medicine  Jun 4, 2024      Assessment & Plan     Encounter for Medicare annual wellness exam    Dyslipidemia  Renew pending labs  - Comprehensive metabolic panel; Future  - Lipid Profile; Future    Fracture Risk Assessment Score (FRAX) indicating greater than 3% risk for hip fracture  - DX Bone Density; Future    Subacute cough  - CBC with Platelets & Differential; Future  - Comprehensive metabolic panel; Future    Body mass index (BMI) 19.9 or less, adult  - DX Bone Density; Future    Personal history of tobacco use  - Prof fee: Shared Decision Making for Lung Cancer Screening  - CT Chest Lung Cancer Scrn Low Dose wo; Future  - DX Bone Density; Future    Screening PSA (prostate specific antigen)  - PSA, screen; Future    Subacute sinusitis, unspecified location  - amoxicillin-clavulanate (AUGMENTIN) 875-125 MG tablet; Take 1 tablet by mouth 2 times daily for 10 days  - fluticasone (FLONASE) 50 MCG/ACT nasal spray; Spray 2 sprays into both nostrils daily    Chronic right-sided low back pain with left-sided sciatica  - XR Hip Right 2-3 Views (Clinic Performed); Future  - XR LUMBAR SPINE 2/3 VIEWS (Clinic Performed); Future    Other specified disorders of bone density and structure, other site  - DX Bone Density; Future        Counseling  Appropriate preventive services were discussed with this patient, including applicable screening as appropriate for fall prevention, nutrition, physical activity, Tobacco-use cessation, weight loss and cognition.  Checklist reviewing preventive services available has been given to the patient.  Reviewed patient's diet, addressing concerns and/or questions.   He is at risk for psychosocial distress and has been provided with information to reduce risk.   Patient reported safety concerns were addressed today.The patient was provided with written information regarding signs of hearing loss.           Brooklyn Ghosh  is a 78 year old, presenting for the following:  Physical        6/4/2024     1:17 PM   Additional Questions   Roomed by Ivelisse Najera   Accompanied by self         6/4/2024     1:17 PM   Patient Reported Additional Medications   Patient reports taking the following new medications none     Health Care Directive  Patient has a Health Care Directive on file  Advance care planning document is on file but is outdated.  Patient encouraged to updated.  Will bring in copy   HPI    HLP - on statin, no side effects    Still smoking, had quit x3 months a while back, then relapsed seeing someone else smoking.  Currently a few per day.  Occasionally using some OTC nicotine lozenges.  No interest in help quitting.    Cough for a few months, mainly after eating, not feeling reflux/heartburn, does have ongoing sinus drainage for a couple months, doesn't feel allergies.    Ongoing right low back pain when sits            6/4/2024   General Health   How would you rate your overall physical health? Good   Feel stress (tense, anxious, or unable to sleep) Only a little   (!) STRESS CONCERN      6/4/2024   Nutrition   Diet: Regular (no restrictions)         6/4/2024   Exercise   Days per week of moderate/strenous exercise 7 days         6/4/2024   Social Factors   Frequency of gathering with friends or relatives Once a week   Worry food won't last until get money to buy more No   Food not last or not have enough money for food? No   Do you have housing?  Yes   Are you worried about losing your housing? No   Lack of transportation? No   Unable to get utilities (heat,electricity)? No         6/4/2024   Fall Risk   Fallen 2 or more times in the past year? No    No   Trouble with walking or balance? No    No          6/4/2024   Activities of Daily Living- Home Safety   Needs help with the following daily activites None of the above   Safety concerns in the home No grab bars in the bathroom         6/4/2024   Dental   Dentist two times every  year? Yes     Dentist upcoming on Wednesday - going every couple months due to needing cavities fixed etc  Eye exam - [place] - last summer        6/4/2024   Hearing Screening   Hearing concerns? (!) I NEED TO ASK PEOPLE TO SPEAK UP OR REPEAT THEMSELVES.    (!) IT'S HARD TO FOLLOW A CONVERSATION IN A NOISY RESTAURANT OR CROWDED ROOM.    (!) TROUBLE UNDERSTANDING SOFT OR WHISPERED SPEECH.     Doesn't feel he needs hearing aides yet        6/4/2024   Driving Risk Screening   Patient/family members have concerns about driving No         6/4/2024   General Alertness/Fatigue Screening   Have you been more tired than usual lately? No         6/4/2024   Urinary Incontinence Screening   Bothered by leaking urine in past 6 months No     Rare wakes at night x1.  No weak stream or trouble starting        6/4/2024   TB Screening   Were you born outside of the [place]? No         Today's PHQ-2 Score:       6/4/2024     1:16 PM   PHQ-2 ( 1999 [employer])   Q1: Little interest or pleasure in doing things 0   Q2: Feeling down, depressed or hopeless 0   PHQ-2 Score 0   Q1: Little interest or pleasure in doing things Not at all   Q2: Feeling down, depressed or hopeless Not at all   PHQ-2 Score 0           6/4/2024   Substance Use   Alcohol more than 3/day or more than 7/wk No   Do you have a current opioid prescription? No   How severe/bad is pain from 1 to 10? 8/10   Do you use any other substances recreationally? No     Social History     Tobacco Use    Smoking status: Every Day     Current packs/day: 0.50     Average packs/day: 0.5 packs/day for 57.4 years (28.7 ttl pk-yrs)     Types: Cigarettes     Start date: 1967    Smokeless tobacco: Never    Tobacco comments:     4/11/23 - current 1/4 ppd, was 1 ppd at max   Substance Use Topics    Alcohol use: Yes     Alcohol/week: 10.0 standard drinks of alcohol     Types: 10 Cans of beer per week    Drug use: No       ASCVD Risk   The ASCVD Risk score (Renea HARRISON, et al., 2019) failed to  calculate for the following reasons:    The valid HDL cholesterol range is 20 to 100 mg/dL    Fracture Risk Assessment Tool  Link to Frax Calculator  Use the information below to complete the Frax calculator  : 1945  Sex: male  Weight (kg): 49.5 kg (actual weight)  Height (cm): 169.5 cm  Previous Fragility Fracture:  Yes  History of parent with fractured hip:  No  Current Smoking:  Yes  Patient has been on glucocorticoids for more than 3 months (5mg/day or more): No  Rheumatoid Arthritis on Problem List:  No  Secondary Osteoporosis on Problem List:  No  Consumes 3 or more units of alcohol per day: Yes  Femoral Neck BMD (g/cm2)        2024   FRAX Calculated Score- 10yr Fracture Probability (%)   Major Osteoporotic- without BMD 10 %   Hip Fracture- without BMD 7.4 %         Reviewed and updated as needed this visit by Provider   Tobacco  Allergies  Meds   Med Hx  Surg Hx  Fam Hx  Soc Hx Sexual   Activity            Current providers sharing in care for this patient include:  Patient Care Team:  Dipesh Vargas DO as PCP - General (Family Medicine)  Dipesh Vargas DO as Assigned PCP    The following health maintenance items are reviewed in Epic and correct as of today:  Health Maintenance   Topic Date Due    RSV VACCINE (Pregnancy & 60+) (1 - 1-dose 60+ series) Never done    ZOSTER IMMUNIZATION (2 of 2) 10/03/2022    COVID-19 Vaccine (2023- season) 2024    NICOTINE/TOBACCO CESSATION COUNSELING Q 1 YR  2024    MEDICARE ANNUAL WELLNESS VISIT  2024    LIPID  2024    LUNG CANCER SCREENING  2024    FALL RISK ASSESSMENT  2025    GLUCOSE  2026    DTAP/TDAP/TD IMMUNIZATION (2 - Td or Tdap) 2028    ADVANCE CARE PLANNING  2029    HEPATITIS C SCREENING  Completed    PHQ-2 (once per calendar year)  Completed    INFLUENZA VACCINE  Completed    Pneumococcal Vaccine: 65+ Years  Completed    IPV IMMUNIZATION  Aged Out    HPV IMMUNIZATION  Aged Out     "MENINGITIS IMMUNIZATION  Aged Out    RSV MONOCLONAL ANTIBODY  Aged Out    COLORECTAL CANCER SCREENING  Discontinued         Review of Systems  Constitutional, HEENT, cardiovascular, pulmonary, GI, , musculoskeletal, neuro, skin, endocrine and psych systems are negative, except as otherwise noted.     Objective    Exam  /68 (BP Location: Left arm, Patient Position: Sitting, Cuff Size: Adult Regular)   Pulse 74   Temp 97.2  F (36.2  C) (Tympanic)   Resp 17   Ht 1.695 m (5' 6.75\")   Wt 49.5 kg (109 lb 1.6 oz)   SpO2 97%   BMI 17.22 kg/m     Estimated body mass index is 17.22 kg/m  as calculated from the following:    Height as of this encounter: 1.695 m (5' 6.75\").    Weight as of this encounter: 49.5 kg (109 lb 1.6 oz).    Physical Exam  Constitutional:       General: He is not in acute distress.     Appearance: Normal appearance.   HENT:      Head: Normocephalic and atraumatic.      Right Ear: Tympanic membrane, ear canal and external ear normal.      Left Ear: Tympanic membrane, ear canal and external ear normal.      Mouth/Throat:      Mouth: Mucous membranes are moist.      Pharynx: Oropharynx is clear.   Eyes:      Extraocular Movements: Extraocular movements intact.      Conjunctiva/sclera: Conjunctivae normal.      Pupils: Pupils are equal, round, and reactive to light.   Neck:      Vascular: No carotid bruit.   Cardiovascular:      Rate and Rhythm: Normal rate and regular rhythm.      Heart sounds: Normal heart sounds. No murmur heard.  Pulmonary:      Effort: Pulmonary effort is normal.      Breath sounds: Normal breath sounds. No wheezing, rhonchi or rales.   Abdominal:      General: Bowel sounds are normal.      Palpations: Abdomen is soft.      Tenderness: There is no abdominal tenderness. There is no guarding.      Hernia: No hernia is present.   Musculoskeletal:         General: Normal range of motion.      Cervical back: Normal range of motion.      Right lower leg: No edema.      Left " lower leg: No edema.      Comments: Low back right sided msk tenderness/tightness   Lymphadenopathy:      Cervical: No cervical adenopathy.   Skin:     General: Skin is warm and dry.   Neurological:      Mental Status: He is alert and oriented to person, place, and time.      Cranial Nerves: No cranial nerve deficit.      Deep Tendon Reflexes: Reflexes normal.   Psychiatric:         Mood and Affect: Mood normal.         Behavior: Behavior normal.         Thought Content: Thought content normal.         Judgment: Judgment normal.                6/4/2024   Mini Cog   Clock Draw Score 2 Normal   3 Item Recall 1 object recalled   Mini Cog Total Score 3              Signed Electronically by: MAMIE ANDERSON, DO  Lung Cancer Screening Shared Decision Making Visit     Augie Hernandez, a 78 year old male, is eligible for lung cancer screening    History   Smoking Status    Every Day    Packs/day: 0.50    Years: 55.00    Types: Cigarettes    Start date: 1967   Smokeless Tobacco    Never       I have discussed with patient the risks and benefits of screening for lung cancer with low-dose CT.     The risks include:    radiation exposure: one low dose chest CT has as much ionizing radiation as about 15 chest x-rays, or 6 months of background radiation living in [place]      false positives: most findings/nodules are NOT cancer, but some might still require additional diagnostic evaluation, including biopsy    over-diagnosis: some slow growing cancers that might never have been clinically significant will be detected and treated unnecessarily     The benefit of early detection of lung cancer is contingent upon adherence to annual screening or more frequent follow up if indicated.     Furthermore, to benefit from screening, Augie must be willing and able to undergo diagnostic procedures, if indicated. Although no specific guide is available for determining severity of comorbidities, it is reasonable to withhold screening in patients  who have greater mortality risk from other diseases.     We did discuss that the best way to prevent lung cancer is to not smoke.    Some patients may value a numeric estimation of lung cancer risk when evaluating if lung cancer screening is right for them, here is one calculator:    ShouldIScreen

## 2024-06-04 NOTE — PATIENT INSTRUCTIONS
Lung Cancer Screening   Frequently Asked Questions  If you are at high-risk for lung cancer, getting screened with low-dose computed tomography (LDCT) every year can help save your life. This handout offers answers to some of the most common questions about lung cancer screening. If you have other questions, please call [phone] ([phone]).     What is it?  Lung cancer screening uses special X-ray technology to create an image of your lung tissue. The exam is quick and easy and takes less than 10 seconds. We don t give you any medicine or use any needles. You can eat before and after the exam. You don t need to change your clothes as long as the clothing on your chest doesn t contain metal. But, you do need to be able to hold your breath for at least 6 seconds during the exam.    What is the goal of lung cancer screening?  The goal of lung cancer screening is to save lives. Many times, lung cancer is not found until a person starts having physical symptoms. Lung cancer screening can help detect lung cancer in the earliest stages when it may be easier to treat.    Who should be screened for lung cancer?  We suggest lung cancer screening for anyone who is at high-risk for lung cancer. You are in the high-risk group if you:      are between the ages of 55 and 79, and    have smoked at least 1 pack of cigarettes a day for 20 or more years, and    still smoke or have quit within the past 15 years.    However, if you have a new cough or shortness of breath, you should talk to your doctor before being screened.    Why does it matter if I have symptoms?  Certain symptoms can be a sign that you have a condition in your lungs that should be checked and treated by your doctor. These symptoms include fever, chest pain, a new or changing cough, shortness of breath that you have never felt before, coughing up blood or unexplained weight loss. Having any of these symptoms can greatly affect the results of lung  cancer screening.       Should all smokers get an LDCT lung cancer screening exam?  It depends. Lung cancer screening is for a very specific group of men and women who have a history of heavy smoking over a long period of time (see  Who should be screened for lung cancer  above).  I am in the high-risk group, but have been diagnosed with cancer in the past. Is LDCT lung cancer screening right for me?  In some cases, you should not have LDCT lung screening, such as when your doctor is already following your cancer with CT scan studies. Your doctor will help you decide if LDCT lung screening is right for you.  Do I need to have a screening exam every year?  Yes. If you are in the high-risk group described earlier, you should get an LDCT lung cancer screening exam every year until you are 79, or are no longer willing or able to undergo screening and possible procedures to diagnose and treat lung cancer.  How effective is LDCT at preventing death from lung cancer?  Studies have shown that LDCT lung cancer screening can lower the risk of death from lung cancer by 20 percent in people who are at high-risk.  What are the risks?  There are some risks and limitations of LDCT lung cancer screening. We want to make sure you understand the risks and benefits, so please let us know if you have any questions. Your doctor may want to talk with you more about these risks.    Radiation exposure: As with any exam that uses radiation, there is a very small increased risk of cancer. The amount of radiation in LDCT is small--about the same amount a person would get from a mammogram. Your doctor orders the exam when he or she feels the potential benefits outweigh the risks.    False negatives: No test is perfect, including LDCT. It is possible that you may have a medical condition, including lung cancer, that is not found during your exam. This is called a false negative result.    False positives and more testing: LDCT very often finds  something in the lung that could be cancer, but in fact is not. This is called a false positive result. False positive tests often cause anxiety. To make sure these findings are not cancer, you may need to have more tests. These tests will be done only if you give us permission. Sometimes patients need a treatment that can have side effects, such as a biopsy. For more information on false positives, see  What can I expect from the results?     Findings not related to lung cancer: Your LDCT exam also takes pictures of areas of your body next to your lungs. In a very small number of cases, the CT scan will show an abnormal finding in one of these areas, such as your kidneys, adrenal glands, liver or thyroid. This finding may not be serious, but you may need more tests. Your doctor can help you decide what other tests you may need, if any.  What can I expect from the results?  About 1 out of 4 LDCT exams will find something that may need more tests. Most of the time, these findings are lung nodules. Lung nodules are very small collections of tissue in the lung. These nodules are very common, and the vast majority--more than 97 percent--are not cancer (benign). Most are normal lymph nodes or small areas of scarring from past infections.  But, if a small lung nodule is found to be cancer, the cancer can be cured more than 90 percent of the time. To know if the nodule is cancer, we may need to get more images before your next yearly screening exam. If the nodule has suspicious features (for example, it is large, has an odd shape or grows over time), we will refer you to a specialist for further testing.  Will my doctor also get the results?  Yes. Your doctor will get a copy of your results.  Is it okay to keep smoking now that there s a cancer screening exam?  No. Tobacco is one of the strongest cancer-causing agents. It causes not only lung cancer, but other cancers and cardiovascular (heart) diseases as well. The damage  caused by smoking builds over time. This means that the longer you smoke, the higher your risk of disease. While it is never too late to quit, the sooner you quit, the better.  Where can I find help to quit smoking?  The best way to prevent lung cancer is to stop smoking. If you have already quit smoking, congratulations and keep it up! For help on quitting smoking, please call [employer] at 3-871-QUITNOW ([phone]) or the [employer] at 1-645.278.3466 to find local resources near you.  One-on-one health coaching:  If you d prefer to work individually with a health care provider on tobacco cessation, we offer:      Medication Therapy Management:  Our specially trained pharmacists work closely with you and your doctor to help you quit smoking.  Call [phone] or [phone] (toll free).

## 2024-06-11 RX ORDER — ATORVASTATIN CALCIUM 20 MG/1
20 TABLET, FILM COATED ORAL DAILY
Qty: 90 TABLET | Refills: 1 | Status: SHIPPED | OUTPATIENT
Start: 2024-06-11 | End: 2024-08-01

## 2024-07-02 ENCOUNTER — HOSPITAL ENCOUNTER (OUTPATIENT)
Dept: BONE DENSITY | Facility: HOSPITAL | Age: 79
Discharge: HOME | End: 2024-07-02
Attending: FAMILY MEDICINE
Payer: MEDICARE

## 2024-07-02 ENCOUNTER — HOSPITAL ENCOUNTER (OUTPATIENT)
Dept: CT IMAGING | Facility: HOSPITAL | Age: 79
Discharge: HOME | End: 2024-07-02
Attending: FAMILY MEDICINE
Payer: MEDICARE

## 2024-07-02 DIAGNOSIS — Z87.891 PERSONAL HISTORY OF TOBACCO USE: ICD-10-CM

## 2024-07-02 DIAGNOSIS — Z91.89 FRACTURE RISK ASSESSMENT SCORE (FRAX) INDICATING GREATER THAN 3% RISK FOR HIP FRACTURE: ICD-10-CM

## 2024-07-02 DIAGNOSIS — M85.88 OTHER SPECIFIED DISORDERS OF BONE DENSITY AND STRUCTURE, OTHER SITE: ICD-10-CM

## 2024-07-02 PROCEDURE — 71271 CT THORAX LUNG CANCER SCR C-: CPT

## 2024-07-02 PROCEDURE — 77080 DXA BONE DENSITY AXIAL: CPT

## 2024-07-15 ENCOUNTER — THERAPY VISIT (OUTPATIENT)
Dept: PHYSICAL THERAPY | Facility: HOSPITAL | Age: 79
End: 2024-07-15
Attending: FAMILY MEDICINE
Payer: MEDICARE

## 2024-07-15 DIAGNOSIS — M54.42 CHRONIC RIGHT-SIDED LOW BACK PAIN WITH LEFT-SIDED SCIATICA: ICD-10-CM

## 2024-07-15 DIAGNOSIS — G89.29 CHRONIC RIGHT-SIDED LOW BACK PAIN WITH LEFT-SIDED SCIATICA: ICD-10-CM

## 2024-07-15 PROCEDURE — 97530 THERAPEUTIC ACTIVITIES: CPT | Mod: GP

## 2024-07-15 PROCEDURE — 97161 PT EVAL LOW COMPLEX 20 MIN: CPT | Mod: GP

## 2024-07-15 PROCEDURE — 97110 THERAPEUTIC EXERCISES: CPT | Mod: GP

## 2024-07-15 NOTE — PROGRESS NOTES
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 [M54.42, G89.29]   Date of onset: 25    Relevant medical history:     PAST MEDICAL HISTORY:   Past Medical History:   Diagnosis Date    Anxiety 2011    Arthritis, lumbar spine 2011    Hypercholesteremia 2011    Mixed sensory-motor polyneuropathy 2011    Nonallopathic lesion of thoracic region, not else 2002    Osteoarthritis 2011    Prostatitis 2011    Radiculopathy of lumbar region 2011    Tobacco abuse 2011       PAST SURGICAL HISTORY:   Past Surgical History:   Procedure Laterality Date    OPEN REDUCTION INTERNAL FIXATION CLAVICLE Left 2016    Procedure: OPEN REDUCTION INTERNAL FIXATION CLAVICLE;  Surgeon: Dipesh Morris MD;  Location: [facility] OR    ORTHOPEDIC SURGERY      left rotator cuff repair       FAMILY HISTORY:   Family History   Problem Relation Age of Onset    Cancer Father          from some form of sinus cancer    C.A.D. Brother          with Coronary Artery Disease at age 51       SOCIAL HISTORY:   Social History     Tobacco Use    Smoking status: Every Day     Current packs/day: 0.50     Average packs/day: 0.5 packs/day for 57.6 years (28.8 ttl pk-yrs)     Types: Cigarettes     Start date:     Smokeless tobacco: Never    Tobacco comments:     23 - current  ppd, was 1 ppd at max   Substance Use Topics    Alcohol use: Yes     Alcohol/week: 10.0 standard drinks of alcohol     Types: 10 Cans of beer per week         Prior diagnostic imaging/testing results: CT scan     Prior therapy history for the same diagnosis, illness or injury:    None for this  complaint    Prior Level of Function  Transfers: Independent  Ambulation: Independent  ADL: Independent    Living Environment  Social support: With a significant other or spouse   Type of home: House   Ramp: No   Stairs inside the home: Yes 4     Help at home: None    Employment: No    Hobbies/Interests:      Patient goals for therapy:  Reduced back pain     Pain assessment: Pain present     Objective   LUMBAR SPINE EVALUATION  PAIN: Pain Level at Rest: 2/10  Pain Level with Use: 5/10  INTEGUMENTARY (edema, incisions): WNL  POSTURE: Sitting Posture: Rounded shoulders, Forward head, Thoracic kyphosis increased  GAIT:   Weightbearing Status: WBAT  Assistive Device(s): None  Gait Deviations: Trendelenberg L, Trendelenberg R  BALANCE/PROPRIOCEPTION: WFL  WEIGHTBEARING ALIGNMENT: WFL  NON-WEIGHTBEARING ALIGNMENT: WFL   ROM:   Lumbar AROM as percentage of expected range for age:  Flexion: 60, HS tightness  Extension: 20, stiffness/pain in low thoracic/lumbar  Side Bending Right: 60  Side Bending Left: 60  Rotation Right: 80  Rotation Left: 80    PELVIC/SI SCREEN: Standing Forward Bend: Uneven- R rotates first and greater, Sacroiliac Provocation Test: POS  STRENGTH: WFL  MYOTOMES: WNL  DTR S: WNL  CORD SIGNS: WNL  DERMATOMES: WNL  NEURAL TENSION: SLR and Slump WNL (HS tightness at 45 degrees BL)  FUNCTIONAL TESTS: Double Leg Squat: Anterior knee translation, Knee valgus, Hip internal rotation, Increased trunk lean, and Improper use of glutes/hips  PALPATION: Acutely TTP R SI/L5, Markedly tender and hypertonic QL (R>L), POS R quadrant testing, Hypomobile segements with PA mobs L3-5 and T5-12    Assessment & Plan   CLINICAL IMPRESSIONS  Medical Diagnosis: Chronic right-sided low back pain with left-sided sciatica (M54.42, G89.29)    Treatment Diagnosis: Low back pain   Impression/Assessment: Patient is a 79 year old male with low back pain complaints with neurogenic presentation consistent with mechanical drivers.  The  following significant findings have been identified: Pain, Decreased ROM/flexibility, Decreased joint mobility, Impaired gait, Impaired muscle performance, Decreased activity tolerance, and Impaired posture. These impairments interfere with their ability to perform self care tasks, recreational activities, household chores, driving , and community mobility as compared to previous level of function.     Clinical Decision Making (Complexity):  Clinical Presentation: Stable/Uncomplicated  Clinical Presentation Rationale: based on medical and personal factors listed in PT evaluation  Clinical Decision Making (Complexity): Low complexity    PLAN OF CARE  Treatment Interventions:  Modalities: Cryotherapy, Dry Needling, E-stim, Hot Pack  Interventions: Gait Training, Manual Therapy, Neuromuscular Re-education, Therapeutic Activity, Therapeutic Exercise, Self-Care/Home Management    Long Term Goals     PT Goal 1  Goal Identifier: STG 1  Goal Description: Patient will be independent with a short-term home exercise program.  Target Date: 08/12/24  PT Goal 2  Goal Identifier: STG 2  Goal Description: Patient will understand and demonstrate improved posture and techniques such that patient places less strain over spine and supporting musculature.  Target Date: 08/12/24  PT Goal 3  Goal Identifier: LTG 1  Goal Description: Improve score on utilized outcome measures to correlate with clinically significant change.  Target Date: 09/09/24  PT Goal 4  Goal Identifier: LTG 2  Goal Description: Patient will endorse confidence in ability to manage home exercise program independently to promote continued progression and management of back pain symptoms following discharge from PT services.  Target Date: 09/09/24      Frequency of Treatment: 1x/week tapered to discharge  Duration of Treatment: 8 weeks    Education Assessment:   Learner/Method: Patient;Listening;Reading;Demonstration;Pictures/Video;No Barriers to Learning    Risks and  benefits of evaluation/treatment have been explained.   Patient/Family/caregiver agrees with Plan of Care.     Evaluation Time:     PT Eval, Low Complexity Minutes (58286): 18     Signing Clinician: Celena Camejo PT        [facility]                                                                                   OUTPATIENT PHYSICAL THERAPY      PLAN OF TREATMENT FOR OUTPATIENT REHABILITATION   Patient's Last Name, First Name, Augie Nogueira YOB: 1945   Provider's Name   [facility]   Medical Record No.  [id number]     Onset Date: 06/11/24  Start of Care Date: 07/15/24     Medical Diagnosis:  Chronic right-sided low back pain with left-sided sciatica (M54.42, G89.29)      PT Treatment Diagnosis:  Low back pain Plan of Treatment  Frequency/Duration: 1x/week tapered to discharge/ 8 weeks    Certification date from 07/15/24 to 09/09/24         See note for plan of treatment details and functional goals     Celena Camejo, PT, DPT, NRP, Cert. DN                         I CERTIFY THE NEED FOR THESE SERVICES FURNISHED UNDER        THIS PLAN OF TREATMENT AND WHILE UNDER MY CARE     (Physician attestation of this document indicates review and certification of the therapy plan).              Referring Provider:  Dipesh Vargas    Initial Assessment  See Epic Evaluation- Start of Care Date: 07/15/24

## 2024-07-22 ENCOUNTER — THERAPY VISIT (OUTPATIENT)
Dept: PHYSICAL THERAPY | Facility: HOSPITAL | Age: 79
End: 2024-07-22
Attending: FAMILY MEDICINE
Payer: MEDICARE

## 2024-07-22 DIAGNOSIS — M54.42 CHRONIC RIGHT-SIDED LOW BACK PAIN WITH LEFT-SIDED SCIATICA: Primary | ICD-10-CM

## 2024-07-22 DIAGNOSIS — G89.29 CHRONIC RIGHT-SIDED LOW BACK PAIN WITH LEFT-SIDED SCIATICA: Primary | ICD-10-CM

## 2024-07-22 PROCEDURE — 97110 THERAPEUTIC EXERCISES: CPT | Mod: GP

## 2024-07-22 PROCEDURE — 97140 MANUAL THERAPY 1/> REGIONS: CPT | Mod: GP

## 2024-07-26 DIAGNOSIS — M81.0 OSTEOPOROSIS, UNSPECIFIED OSTEOPOROSIS TYPE, UNSPECIFIED PATHOLOGICAL FRACTURE PRESENCE: Primary | ICD-10-CM

## 2024-07-26 DIAGNOSIS — M81.8 AGE-RELATED OSTEOPOROSIS WITHOUT FRACTURE: ICD-10-CM

## 2024-07-29 ENCOUNTER — THERAPY VISIT (OUTPATIENT)
Dept: PHYSICAL THERAPY | Facility: HOSPITAL | Age: 79
End: 2024-07-29
Attending: FAMILY MEDICINE
Payer: MEDICARE

## 2024-07-29 DIAGNOSIS — G89.29 CHRONIC RIGHT-SIDED LOW BACK PAIN WITH LEFT-SIDED SCIATICA: Primary | ICD-10-CM

## 2024-07-29 DIAGNOSIS — M54.42 CHRONIC RIGHT-SIDED LOW BACK PAIN WITH LEFT-SIDED SCIATICA: Primary | ICD-10-CM

## 2024-07-29 PROCEDURE — 97140 MANUAL THERAPY 1/> REGIONS: CPT | Mod: GP

## 2024-07-29 PROCEDURE — 97010 HOT OR COLD PACKS THERAPY: CPT | Mod: GP

## 2024-07-30 ENCOUNTER — LAB (OUTPATIENT)
Dept: LAB | Facility: OTHER | Age: 79
End: 2024-07-30
Payer: MEDICARE

## 2024-07-30 DIAGNOSIS — E78.5 DYSLIPIDEMIA: ICD-10-CM

## 2024-07-30 DIAGNOSIS — M81.0 OSTEOPOROSIS, UNSPECIFIED OSTEOPOROSIS TYPE, UNSPECIFIED PATHOLOGICAL FRACTURE PRESENCE: ICD-10-CM

## 2024-07-30 DIAGNOSIS — M81.8 AGE-RELATED OSTEOPOROSIS WITHOUT FRACTURE: ICD-10-CM

## 2024-07-30 DIAGNOSIS — Z12.5 SCREENING PSA (PROSTATE SPECIFIC ANTIGEN): ICD-10-CM

## 2024-07-30 DIAGNOSIS — R05.2 SUBACUTE COUGH: ICD-10-CM

## 2024-07-30 LAB
ALBUMIN SERPL BCG-MCNC: 4.1 G/DL (ref 3.5–5.2)
ALP SERPL-CCNC: 56 U/L (ref 40–150)
ALT SERPL W P-5'-P-CCNC: 21 U/L (ref 0–70)
ANION GAP SERPL CALCULATED.3IONS-SCNC: 9 MMOL/L (ref 7–15)
AST SERPL W P-5'-P-CCNC: 28 U/L (ref 0–45)
BASOPHILS # BLD AUTO: 0.1 10E3/UL (ref 0–0.2)
BASOPHILS NFR BLD AUTO: 1 %
BILIRUB SERPL-MCNC: 0.4 MG/DL
BUN SERPL-MCNC: 20.6 MG/DL (ref 8–23)
CALCIUM SERPL-MCNC: 9.4 MG/DL (ref 8.8–10.4)
CHLORIDE SERPL-SCNC: 104 MMOL/L (ref 98–107)
CHOLEST SERPL-MCNC: 193 MG/DL
CREAT SERPL-MCNC: 0.92 MG/DL (ref 0.67–1.17)
EGFRCR SERPLBLD CKD-EPI 2021: 85 ML/MIN/1.73M2
EOSINOPHIL # BLD AUTO: 0.2 10E3/UL (ref 0–0.7)
EOSINOPHIL NFR BLD AUTO: 3 %
ERYTHROCYTE [DISTWIDTH] IN BLOOD BY AUTOMATED COUNT: 13.3 % (ref 10–15)
FASTING STATUS PATIENT QL REPORTED: YES
FASTING STATUS PATIENT QL REPORTED: YES
GLUCOSE SERPL-MCNC: 95 MG/DL (ref 70–99)
HCO3 SERPL-SCNC: 25 MMOL/L (ref 22–29)
HCT VFR BLD AUTO: 42.6 % (ref 40–53)
HDLC SERPL-MCNC: 93 MG/DL
HGB BLD-MCNC: 13.9 G/DL (ref 13.3–17.7)
IMM GRANULOCYTES # BLD: 0 10E3/UL
IMM GRANULOCYTES NFR BLD: 0 %
LDLC SERPL CALC-MCNC: 93 MG/DL
LYMPHOCYTES # BLD AUTO: 1.4 10E3/UL (ref 0.8–5.3)
LYMPHOCYTES NFR BLD AUTO: 23 %
MAGNESIUM SERPL-MCNC: 2.2 MG/DL (ref 1.7–2.3)
MCH RBC QN AUTO: 30.2 PG (ref 26.5–33)
MCHC RBC AUTO-ENTMCNC: 32.6 G/DL (ref 31.5–36.5)
MCV RBC AUTO: 92 FL (ref 78–100)
MONOCYTES # BLD AUTO: 0.6 10E3/UL (ref 0–1.3)
MONOCYTES NFR BLD AUTO: 10 %
NEUTROPHILS # BLD AUTO: 4 10E3/UL (ref 1.6–8.3)
NEUTROPHILS NFR BLD AUTO: 64 %
NONHDLC SERPL-MCNC: 100 MG/DL
NRBC # BLD AUTO: 0 10E3/UL
NRBC BLD AUTO-RTO: 0 /100
PHOSPHATE SERPL-MCNC: 2.9 MG/DL (ref 2.5–4.5)
PLATELET # BLD AUTO: 216 10E3/UL (ref 150–450)
POTASSIUM SERPL-SCNC: 4.8 MMOL/L (ref 3.4–5.3)
PROT SERPL-MCNC: 6.9 G/DL (ref 6.4–8.3)
PSA SERPL DL<=0.01 NG/ML-MCNC: 1.6 NG/ML (ref 0–6.5)
RBC # BLD AUTO: 4.61 10E6/UL (ref 4.4–5.9)
SODIUM SERPL-SCNC: 138 MMOL/L (ref 135–145)
TRIGL SERPL-MCNC: 34 MG/DL
TSH SERPL DL<=0.005 MIU/L-ACNC: 1.88 UIU/ML (ref 0.3–4.2)
VIT D+METAB SERPL-MCNC: 29 NG/ML (ref 20–50)
WBC # BLD AUTO: 6.3 10E3/UL (ref 4–11)

## 2024-07-30 PROCEDURE — 80061 LIPID PANEL: CPT | Mod: ZL

## 2024-07-30 PROCEDURE — G0103 PSA SCREENING: HCPCS | Mod: ZL

## 2024-07-30 PROCEDURE — 36415 COLL VENOUS BLD VENIPUNCTURE: CPT | Mod: ZL

## 2024-07-30 PROCEDURE — 84443 ASSAY THYROID STIM HORMONE: CPT | Mod: ZL

## 2024-07-30 PROCEDURE — 82306 VITAMIN D 25 HYDROXY: CPT | Mod: ZL

## 2024-07-30 PROCEDURE — 85041 AUTOMATED RBC COUNT: CPT | Mod: ZL

## 2024-07-30 PROCEDURE — 83735 ASSAY OF MAGNESIUM: CPT | Mod: ZL

## 2024-07-30 PROCEDURE — 84100 ASSAY OF PHOSPHORUS: CPT | Mod: ZL

## 2024-07-30 PROCEDURE — 80053 COMPREHEN METABOLIC PANEL: CPT | Mod: ZL

## 2024-08-01 ENCOUNTER — OFFICE VISIT (OUTPATIENT)
Dept: FAMILY MEDICINE | Facility: OTHER | Age: 79
End: 2024-08-01
Attending: FAMILY MEDICINE
Payer: COMMERCIAL

## 2024-08-01 VITALS
DIASTOLIC BLOOD PRESSURE: 77 MMHG | HEIGHT: 67 IN | RESPIRATION RATE: 16 BRPM | WEIGHT: 106.1 LBS | TEMPERATURE: 97 F | HEART RATE: 82 BPM | OXYGEN SATURATION: 96 % | BODY MASS INDEX: 16.65 KG/M2 | SYSTOLIC BLOOD PRESSURE: 137 MMHG

## 2024-08-01 DIAGNOSIS — E78.5 DYSLIPIDEMIA: ICD-10-CM

## 2024-08-01 DIAGNOSIS — M81.0 AGE-RELATED OSTEOPOROSIS WITHOUT CURRENT PATHOLOGICAL FRACTURE: Primary | ICD-10-CM

## 2024-08-01 DIAGNOSIS — J01.90 SUBACUTE SINUSITIS, UNSPECIFIED LOCATION: ICD-10-CM

## 2024-08-01 LAB
ALBUMIN UR-MCNC: NEGATIVE MG/DL
APPEARANCE UR: CLEAR
BILIRUB UR QL STRIP: NEGATIVE
CALCIUM UR-MCNC: 10.4 MG/DL
CALCIUM/CREAT UR: 0.29 G/G CR (ref 0.05–0.27)
COLOR UR AUTO: NORMAL
CREAT UR-MCNC: 35.5 MG/DL
GLUCOSE UR STRIP-MCNC: NEGATIVE MG/DL
HGB UR QL STRIP: NEGATIVE
KETONES UR STRIP-MCNC: NEGATIVE MG/DL
LEUKOCYTE ESTERASE UR QL STRIP: NEGATIVE
NITRATE UR QL: NEGATIVE
PH UR STRIP: 6 [PH] (ref 4.7–8)
RBC URINE: 1 /HPF
SP GR UR STRIP: 1.01 (ref 1–1.03)
SQUAMOUS EPITHELIAL: 0 /HPF
UROBILINOGEN UR STRIP-MCNC: NORMAL MG/DL
WBC URINE: <1 /HPF

## 2024-08-01 PROCEDURE — G0463 HOSPITAL OUTPT CLINIC VISIT: HCPCS

## 2024-08-01 PROCEDURE — 99214 OFFICE O/P EST MOD 30 MIN: CPT | Performed by: FAMILY MEDICINE

## 2024-08-01 PROCEDURE — 81001 URINALYSIS AUTO W/SCOPE: CPT | Mod: ZL | Performed by: FAMILY MEDICINE

## 2024-08-01 PROCEDURE — 82340 ASSAY OF CALCIUM IN URINE: CPT | Mod: ZL | Performed by: FAMILY MEDICINE

## 2024-08-01 RX ORDER — ATORVASTATIN CALCIUM 20 MG/1
20 TABLET, FILM COATED ORAL DAILY
Qty: 90 TABLET | Refills: 1 | Status: CANCELLED | OUTPATIENT
Start: 2024-08-01

## 2024-08-01 RX ORDER — ALENDRONATE SODIUM 70 MG/1
70 TABLET ORAL
Qty: 12 TABLET | Refills: 3 | Status: SHIPPED | OUTPATIENT
Start: 2024-08-01

## 2024-08-01 RX ORDER — FLUTICASONE PROPIONATE 50 MCG
2 SPRAY, SUSPENSION (ML) NASAL DAILY
Qty: 9.1 ML | Refills: 1 | Status: CANCELLED | OUTPATIENT
Start: 2024-08-01

## 2024-08-01 RX ORDER — ATORVASTATIN CALCIUM 20 MG/1
20 TABLET, FILM COATED ORAL DAILY
Qty: 90 TABLET | Refills: 1 | Status: SHIPPED | OUTPATIENT
Start: 2024-08-01

## 2024-08-01 RX ORDER — FLUTICASONE PROPIONATE 50 MCG
2 SPRAY, SUSPENSION (ML) NASAL DAILY
Qty: 9.1 ML | Refills: 1 | Status: SHIPPED | OUTPATIENT
Start: 2024-08-01

## 2024-08-01 NOTE — PROGRESS NOTES
"  Assessment & Plan     Age-related osteoporosis without current pathological fracture  He does not wish to pursue extensive testing - family all skinny, osteoporosis runs in family.  Would like to start treatment.  - alendronate (FOSAMAX) 70 MG tablet; Take 1 tablet (70 mg) by mouth every 7 days  - Calcium random urine with Creat Ratio  - UA Macroscopic with reflex to Microscopic and Culture    Subacute sinusitis, unspecified location  States needs refill sent in  - fluticasone (FLONASE) 50 MCG/ACT nasal spray; Spray 2 sprays into both nostrils daily    Dyslipidemia  States needs refill sent in  - atorvastatin (LIPITOR) 20 MG tablet; Take 1 tablet (20 mg) by mouth daily              Brooklyn Ghosh is a 79 year old, presenting for the following health issues:  Results        8/1/2024     8:44 AM   Additional Questions   Roomed by alissa farmer   Accompanied by none         8/1/2024     8:44 AM   Patient Reported Additional Medications   Patient reports taking the following new medications none     HPI     Reviewed previous DEXA.  Osteoporosis.  His BMI is 16.7, whole family is skinny, FamHx of osteoporosis.  Does not wish to have extensive eval for secondary causes.  He does wish to start treatment.  No known GI issues that would prevent alendronate.        Objective    /77 (BP Location: Left arm, Patient Position: Sitting, Cuff Size: Adult Regular)   Pulse 82   Temp 97  F (36.1  C) (Tympanic)   Resp 16   Ht 1.695 m (5' 6.75\")   Wt 48.1 kg (106 lb 1.6 oz)   SpO2 96%   BMI 16.74 kg/m    Body mass index is 16.74 kg/m .  Physical Exam  Constitutional:       General: He is not in acute distress.     Appearance: Normal appearance.   Cardiovascular:      Rate and Rhythm: Normal rate and regular rhythm.      Heart sounds: Normal heart sounds. No murmur heard.  Pulmonary:      Effort: Pulmonary effort is normal.      Breath sounds: Normal breath sounds.   Abdominal:      General: Bowel sounds are normal. There " is no distension.      Palpations: Abdomen is soft.      Tenderness: There is no abdominal tenderness.   Musculoskeletal:      Right lower leg: No edema.      Left lower leg: No edema.   Skin:     Coloration: Skin is not jaundiced.   Neurological:      Mental Status: He is alert and oriented to person, place, and time.                    Signed Electronically by: MAMIE ANDERSON DO

## 2024-08-05 ENCOUNTER — THERAPY VISIT (OUTPATIENT)
Dept: PHYSICAL THERAPY | Facility: HOSPITAL | Age: 79
End: 2024-08-05
Attending: FAMILY MEDICINE
Payer: MEDICARE

## 2024-08-05 DIAGNOSIS — M54.42 CHRONIC RIGHT-SIDED LOW BACK PAIN WITH LEFT-SIDED SCIATICA: Primary | ICD-10-CM

## 2024-08-05 DIAGNOSIS — G89.29 CHRONIC RIGHT-SIDED LOW BACK PAIN WITH LEFT-SIDED SCIATICA: Primary | ICD-10-CM

## 2024-08-05 PROCEDURE — 97140 MANUAL THERAPY 1/> REGIONS: CPT | Mod: GP

## 2024-08-05 PROCEDURE — 97110 THERAPEUTIC EXERCISES: CPT | Mod: GP

## 2024-08-26 ENCOUNTER — THERAPY VISIT (OUTPATIENT)
Dept: PHYSICAL THERAPY | Facility: HOSPITAL | Age: 79
End: 2024-08-26
Attending: FAMILY MEDICINE
Payer: MEDICARE

## 2024-08-26 DIAGNOSIS — M54.9 BACK PAIN: Primary | ICD-10-CM

## 2024-08-26 PROCEDURE — 97140 MANUAL THERAPY 1/> REGIONS: CPT | Mod: GP,CQ

## 2024-08-26 PROCEDURE — 97110 THERAPEUTIC EXERCISES: CPT | Mod: GP,CQ

## 2024-08-31 ENCOUNTER — HOSPITAL ENCOUNTER (EMERGENCY)
Facility: HOSPITAL | Age: 79
Discharge: HOME | End: 2024-08-31
Attending: EMERGENCY MEDICINE | Admitting: EMERGENCY MEDICINE
Payer: MEDICARE

## 2024-08-31 VITALS
SYSTOLIC BLOOD PRESSURE: 155 MMHG | TEMPERATURE: 97.9 F | OXYGEN SATURATION: 97 % | DIASTOLIC BLOOD PRESSURE: 58 MMHG | RESPIRATION RATE: 16 BRPM | HEART RATE: 74 BPM

## 2024-08-31 DIAGNOSIS — S61.451A DOG BITE, HAND, RIGHT, INITIAL ENCOUNTER: ICD-10-CM
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
## 2024-08-31 PROCEDURE — 99284 EMERGENCY DEPT VISIT MOD MDM: CPT

## 2024-08-31 PROCEDURE — 99283 EMERGENCY DEPT VISIT LOW MDM: CPT | Performed by: EMERGENCY MEDICINE

## 2024-08-31 RX ORDER — BACITRACIN ZINC 500 [USP'U]/G
OINTMENT TOPICAL DAILY
Qty: 15 G | Refills: 1 | Status: SHIPPED | OUTPATIENT
Start: 2024-08-31

## 2024-08-31 ASSESSMENT — COLUMBIA-SUICIDE SEVERITY RATING SCALE - C-SSRS
1. IN THE PAST MONTH, HAVE YOU WISHED YOU WERE DEAD OR WISHED YOU COULD GO TO SLEEP AND NOT WAKE UP?: NO
2. HAVE YOU ACTUALLY HAD ANY THOUGHTS OF KILLING YOURSELF IN THE PAST MONTH?: NO
6. HAVE YOU EVER DONE ANYTHING, STARTED TO DO ANYTHING, OR PREPARED TO DO ANYTHING TO END YOUR LIFE?: NO

## 2024-08-31 NOTE — ED PROVIDER NOTES
History     Chief Complaint   Patient presents with    Dog Bite     HPI  Augie Hernandez is a 79 year old male who presents with a dog bite to his right hand.  It was unknown dog, his son's dog, he was reaching in the back seat of his vehicle to pet him and was bit on his right hand.  The dog's vaccinations are up-to-date including rabies, patient's last tetanus vaccine is up-to-date within 10 years it was in 2018.  Patient has no neurovascular symptoms.  Patient sustained 3 skin tears to the dorsum of his right hand.  No tendon dysfunction.  He denies any other injuries.    Allergies:  Allergies   Allergen Reactions    Duloxetine Hydrochloride Nausea     Cymbalta    Azithromycin Hives and Rash     Zithromax       Problem List:    Patient Active Problem List    Diagnosis Date Noted    Back pain 08/26/2024     Priority: Medium    ACP (advance care planning) 06/21/2016     Priority: Medium     Advance Care Planning 10/5/2016: Receipt of ACP document:  Received: Health Care Directive which was witnessed or notarized on 12/29/09.  Document previously scanned on 8/1/16.  Validation form completed and sent to be scanned.  Code Status reflects choices in most recent ACP document.  Confirmed/documented designated decision maker(s).  Added by [place]   Advance Care Planning Liaison  Advance Care Planning 6/21/2016: ACP Review of Chart / Resources Provided:  Reviewed chart for advance care plan.  Augie Hernandez has an up to date advance care plan on file.  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    Nonallopathic lesion of thoracic region, not else 1/28/2002    Osteoarthritis 11/1/2011    Prostatitis  2011    Radiculopathy of lumbar region 2011    Tobacco abuse 2011       Past Surgical History:    Past Surgical History:   Procedure Laterality Date    OPEN REDUCTION INTERNAL FIXATION CLAVICLE Left 2016    Procedure: OPEN REDUCTION INTERNAL FIXATION CLAVICLE;  Surgeon: Dipesh Morris MD;  Location: [facility]    ORTHOPEDIC SURGERY      left rotator cuff repair       Family History:    Family History   Problem Relation Age of Onset    Cancer Father          from some form of sinus cancer    C.A.D. Brother          with Coronary Artery Disease at age 51       Social History:  Marital Status:   [2]  Social History     Tobacco Use    Smoking status: Every Day     Current packs/day: 0.50     Average packs/day: 0.5 packs/day for 57.7 years (28.8 ttl pk-yrs)     Types: Cigarettes     Start date:     Smokeless tobacco: Never    Tobacco comments:     23 - current  ppd, was 1 ppd at max   Substance Use Topics    Alcohol use: Yes     Alcohol/week: 10.0 standard drinks of alcohol     Types: 10 Cans of beer per week    Drug use: No        Medications:    amoxicillin-clavulanate (AUGMENTIN) 875-125 MG tablet  bacitracin 500 UNIT/GM external ointment  alendronate (FOSAMAX) 70 MG tablet  atorvastatin (LIPITOR) 20 MG tablet  fluticasone (FLONASE) 50 MCG/ACT nasal spray          Review of Systems   All other systems reviewed and are negative.      Physical Exam   BP: 155/58  Pulse: 74  Temp: 97.9  F (36.6  C)  Resp: 16  SpO2: 97 %      Physical Exam  Vitals and nursing note reviewed.   Constitutional:       Appearance: Normal appearance. He is normal weight.   HENT:      Head: Normocephalic and atraumatic.      Right Ear: External ear normal.      Left Ear: External ear normal.      Nose: Nose normal.      Mouth/Throat:      Pharynx: Oropharynx is clear.   Eyes:      Extraocular Movements: Extraocular movements intact.      Conjunctiva/sclera: Conjunctivae normal.   Cardiovascular:       Rate and Rhythm: Normal rate and regular rhythm.      Pulses: Normal pulses.   Pulmonary:      Effort: Pulmonary effort is normal.   Musculoskeletal:         General: Tenderness and signs of injury present. No swelling or deformity. Normal range of motion.      Cervical back: Normal range of motion and neck supple.   Skin:     General: Skin is warm and dry.      Capillary Refill: Capillary refill takes less than 2 seconds.      Comments: Patient has 2 superficial skin tears 1 on the mid dorsum of the right hand that is about 2 cm and another parallel to it over the second MCP about 2 cm and 1 smaller 1 about 0.7 to 1 cm on the proximal mid hand.  No significant active bleeding.  Tendon function motor sensation coordination strength capillary refill peripheral pulses are all intact   Neurological:      General: No focal deficit present.      Mental Status: He is alert and oriented to person, place, and time. Mental status is at baseline.   Psychiatric:         Mood and Affect: Mood normal.         Behavior: Behavior normal.         Thought Content: Thought content normal.         Judgment: Judgment normal.         ED Course        Procedures              Critical Care time:  none               No results found for this or any previous visit (from the past 24 hour(s)).    Medications - No data to display    Assessments & Plan (with Medical Decision Making)     I have reviewed the nursing notes.    I have reviewed the findings, diagnosis, plan and need for follow up with the patient.           Medical Decision Making  The patient's presentation was of low complexity (an acute and uncomplicated illness or injury).    The patient's evaluation involved:  history and exam without other MDM data elements    The patient's management necessitated moderate risk (prescription drug management including medications given in the ED).        New Prescriptions    AMOXICILLIN-CLAVULANATE (AUGMENTIN) 875-125 MG TABLET    Take 1  tablet by mouth 2 times daily for 7 days.    BACITRACIN 500 UNIT/GM EXTERNAL OINTMENT    Apply topically daily.       Final diagnoses:   Dog bite, hand, right, initial encounter       8/31/2024   [facility]       Saw Orozco MD  08/31/24 1938

## 2024-09-03 ENCOUNTER — TELEPHONE (OUTPATIENT)
Dept: FAMILY MEDICINE | Facility: OTHER | Age: 79
End: 2024-09-03

## 2024-09-03 ENCOUNTER — TELEPHONE (OUTPATIENT)
Dept: CARE COORDINATION | Facility: OTHER | Age: 79
End: 2024-09-03

## 2024-09-03 NOTE — TELEPHONE ENCOUNTER
Symptom or reason needing to speak to RN: pt called stating that he was seen in the ER for a dog bite, was advised to follow up with his primary in a couple of days      Best number to return call: [phone]     Best time to return call: anytime

## 2024-09-03 NOTE — TELEPHONE ENCOUNTER
Patient called to scheduled an ED follow up at recommendations of ER doc. Patient said that he was bit by a dog Saturday and went to the ED. ED notes report superficial scratches. Patient was given 7 days Augmentin and bacitracin ointment. Patient reports he is doing well and he feels he don't need a follow up with PCP. Patient denies and signs or symptoms of infection.  Patient agreed to call back if he notices any signs of infection.

## 2024-09-04 ENCOUNTER — THERAPY VISIT (OUTPATIENT)
Dept: PHYSICAL THERAPY | Facility: HOSPITAL | Age: 79
End: 2024-09-04
Attending: FAMILY MEDICINE
Payer: MEDICARE

## 2024-09-04 DIAGNOSIS — M54.9 BACK PAIN: Primary | ICD-10-CM

## 2024-09-04 PROCEDURE — 97140 MANUAL THERAPY 1/> REGIONS: CPT | Mod: GP,CQ

## 2024-09-12 ENCOUNTER — THERAPY VISIT (OUTPATIENT)
Dept: PHYSICAL THERAPY | Facility: HOSPITAL | Age: 79
End: 2024-09-12
Attending: FAMILY MEDICINE
Payer: MEDICARE

## 2024-09-12 DIAGNOSIS — M54.9 BACK PAIN: Primary | ICD-10-CM

## 2024-09-12 PROCEDURE — 97530 THERAPEUTIC ACTIVITIES: CPT | Mod: GP

## 2024-09-12 PROCEDURE — 97110 THERAPEUTIC EXERCISES: CPT | Mod: GP

## 2024-10-01 PROBLEM — M54.9 BACK PAIN: Status: RESOLVED | Noted: 2024-08-26 | Resolved: 2024-10-01

## 2025-06-23 ENCOUNTER — HOSPITAL ENCOUNTER (EMERGENCY)
Facility: HOSPITAL | Age: 80
Discharge: HOME | End: 2025-06-23
Payer: MEDICARE

## 2025-06-23 VITALS
SYSTOLIC BLOOD PRESSURE: 156 MMHG | HEART RATE: 88 BPM | TEMPERATURE: 97.4 F | DIASTOLIC BLOOD PRESSURE: 83 MMHG | RESPIRATION RATE: 18 BRPM | OXYGEN SATURATION: 98 %

## 2025-06-23 DIAGNOSIS — S80.862A TICK BITE OF LEFT LOWER LEG, INITIAL ENCOUNTER: ICD-10-CM

## 2025-06-23 DIAGNOSIS — L03.116 CELLULITIS OF LEFT LOWER LEG: ICD-10-CM
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
## 2025-06-23 PROCEDURE — 99213 OFFICE O/P EST LOW 20 MIN: CPT

## 2025-06-23 PROCEDURE — G0463 HOSPITAL OUTPT CLINIC VISIT: HCPCS

## 2025-06-23 RX ORDER — DOXYCYCLINE 100 MG/1
100 CAPSULE ORAL 2 TIMES DAILY
Qty: 20 CAPSULE | Refills: 0 | Status: SHIPPED | OUTPATIENT
Start: 2025-06-23 | End: 2025-07-03

## 2025-06-23 ASSESSMENT — ENCOUNTER SYMPTOMS
MYALGIAS: 1
CARDIOVASCULAR NEGATIVE: 1
WOUND: 1
COUGH: 0
CHILLS: 0
PSYCHIATRIC NEGATIVE: 1
HEADACHES: 0
GASTROINTESTINAL NEGATIVE: 1
FATIGUE: 0
DIZZINESS: 0
FEVER: 0
ACTIVITY CHANGE: 0
SORE THROAT: 0
HEMATOLOGIC/LYMPHATIC NEGATIVE: 1
COLOR CHANGE: 1

## 2025-06-23 ASSESSMENT — COLUMBIA-SUICIDE SEVERITY RATING SCALE - C-SSRS
2. HAVE YOU ACTUALLY HAD ANY THOUGHTS OF KILLING YOURSELF IN THE PAST MONTH?: NO
1. IN THE PAST MONTH, HAVE YOU WISHED YOU WERE DEAD OR WISHED YOU COULD GO TO SLEEP AND NOT WAKE UP?: NO
6. HAVE YOU EVER DONE ANYTHING, STARTED TO DO ANYTHING, OR PREPARED TO DO ANYTHING TO END YOUR LIFE?: NO

## 2025-06-23 NOTE — ED PROVIDER NOTES
"  History     Chief Complaint   Patient presents with    Insect Bite     The history is provided by the patient.     Augie Hernandez is a 79 year old male who presents for evaluation of a tick bite which occurred 3 days ago.  He removed the tick, and states he put alcohol over it to clean the area.  He is also been scratching profusely.  He denies pain, but endorses a burning sensation\" it feels warm\".  Denies fevers, chills, body aches, shortness of breath, chest pain, abdominal pain.  Given that we are greater than 48 hours since the tick was removed, does not qualify for single dose of doxycycline.  Has allergies to azithromycin.    Allergies:  Allergies   Allergen Reactions    Duloxetine Hydrochloride Nausea     Cymbalta    Azithromycin Hives and Rash     Zithromax       Problem List:    Patient Active Problem List    Diagnosis Date Noted    ACP (advance care planning) 06/21/2016     Priority: Medium     Advance Care Planning 10/5/2016: Receipt of ACP document:  Received: Health Care Directive which was witnessed or notarized on 12/29/09.  Document previously scanned on 8/1/16.  Validation form completed and sent to be scanned.  Code Status reflects choices in most recent ACP document.  Confirmed/documented designated decision maker(s).  Added by [place]   Advance Care Planning Liaison  Advance Care Planning 6/21/2016: ACP Review of Chart / Resources Provided:  Reviewed chart for advance care plan.  Augie Hernandez has an up to date advance care plan on file.  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    Nonallopathic lesion of thoracic region, not else 1/28/2002    Osteoarthritis 11/1/2011    " Prostatitis 2011    Radiculopathy of lumbar region 2011    Tobacco abuse 2011       Past Surgical History:    Past Surgical History:   Procedure Laterality Date    OPEN REDUCTION INTERNAL FIXATION CLAVICLE Left 2016    Procedure: OPEN REDUCTION INTERNAL FIXATION CLAVICLE;  Surgeon: Dipesh Morris MD;  Location: [facility] OR    ORTHOPEDIC SURGERY      left rotator cuff repair       Family History:    Family History   Problem Relation Age of Onset    Cancer Father          from some form of sinus cancer    C.A.D. Brother          with Coronary Artery Disease at age 51       Social History:  Marital Status:   [2]  Social History     Tobacco Use    Smoking status: Every Day     Current packs/day: 0.50     Average packs/day: 0.5 packs/day for 58.5 years (29.2 ttl pk-yrs)     Types: Cigarettes     Start date:     Smokeless tobacco: Never    Tobacco comments:     23 - current  ppd, was 1 ppd at max   Substance Use Topics    Alcohol use: Yes     Alcohol/week: 10.0 standard drinks of alcohol     Types: 10 Cans of beer per week    Drug use: No        Medications:    doxycycline hyclate (VIBRAMYCIN) 100 MG capsule  alendronate (FOSAMAX) 70 MG tablet  atorvastatin (LIPITOR) 20 MG tablet  bacitracin 500 UNIT/GM external ointment  fluticasone (FLONASE) 50 MCG/ACT nasal spray          Review of Systems   Constitutional:  Negative for activity change, chills, fatigue and fever.   HENT: Negative.  Negative for congestion, ear pain, postnasal drip and sore throat.    Respiratory:  Negative for cough.    Cardiovascular: Negative.  Negative for chest pain and leg swelling.   Gastrointestinal: Negative.    Genitourinary: Negative.    Musculoskeletal:  Positive for myalgias.   Skin:  Positive for color change and wound. Negative for pallor and rash.   Neurological:  Negative for dizziness and headaches.   Hematological: Negative.    Psychiatric/Behavioral: Negative.         Physical Exam    BP: (!) 156/83  Pulse: 88  Temp: 97.4  F (36.3  C)  Resp: 18  SpO2: 98 %      Physical Exam  Vitals reviewed.   Constitutional:       General: He is not in acute distress.     Appearance: He is not ill-appearing, toxic-appearing or diaphoretic.   Pulmonary:      Effort: Pulmonary effort is normal.   Musculoskeletal:         General: Tenderness present.      Right lower leg: No edema.      Left lower leg: No edema.   Skin:     General: Skin is warm.      Capillary Refill: Capillary refill takes less than 2 seconds.      Findings: Wound present.          Neurological:      General: No focal deficit present.      Mental Status: He is alert and oriented to person, place, and time. Mental status is at baseline.         ED Course          No results found for this or any previous visit (from the past 24 hours).    Medications - No data to display    Assessments & Plan (with Medical Decision Making)     I have reviewed the nursing notes.    I have reviewed the findings, diagnosis, plan and need for follow up with the patient.    Medical Decision Making    (L03.116) Cellulitis of left lower leg  (S80.862A,  W57.XXXA) Tick bite of left lower leg, initial encounter  Comment: Does not qualify for one-time dose of oral doxycycline.  Given cellulitis, treatment prescribed is twice a day for 10 days.  Plan: doxycycline hyclate (VIBRAMYCIN) 100 MG capsule    Discharge instructions and education completed with Augie Hernandez. Augie Hernandez verbalized understanding of eduction and discharge instructions provided, which includes return to ED for re-evaluation criteria. They are in agreement with plan of care.       Discharge Medication List as of 6/23/2025  8:32 AM        START taking these medications    Details   doxycycline hyclate (VIBRAMYCIN) 100 MG capsule Take 1 capsule (100 mg) by mouth 2 times daily for 10 days., Disp-20 capsule, R-0, E-Prescribe             Final diagnoses:   Tick bite of left lower leg, initial encounter    Cellulitis of left lower leg       6/23/2025   [facility]       Jaylyn Low, APRN CNP  06/23/25 0906

## 2025-06-23 NOTE — DISCHARGE INSTRUCTIONS
You were prescribed doxycycline. Please take the entire prescription as ordered. Do not stop antibiotic prior to completion unless directed to do so by your medical provider.     Doxycycline may cause photosensitivity. Please be extra cautious with sun exposure. Stay in the shade, wear large hats, long sleeves/pants and re-apply sunscreen per packaging recommendations.     Doxycycline may cause GI upset. You may take with food.     Avoid milk, vitamins and taking other prescribed medications for 2 hours before and after taking medication.     You may place OTC antibiotic ointment or emollient ointment (such as Aquaphor) to help itching    Tylenol/ibuprofen for pain and discomfort    The doxycycline will cover both the soft tissue infection and possible tickborne disease. Since I am treating you, I did not draw labs to confirm tickborne disease.     You should be feeling much better in 2 days. If you develop fevers after 48 hours of antibiotics, please return to be evaluated

## 2025-07-06 ENCOUNTER — HEALTH MAINTENANCE LETTER (OUTPATIENT)
Age: 80
End: 2025-07-06